# Patient Record
Sex: MALE | Race: WHITE | NOT HISPANIC OR LATINO
[De-identification: names, ages, dates, MRNs, and addresses within clinical notes are randomized per-mention and may not be internally consistent; named-entity substitution may affect disease eponyms.]

---

## 2020-05-27 PROBLEM — Z00.00 ENCOUNTER FOR PREVENTIVE HEALTH EXAMINATION: Status: ACTIVE | Noted: 2020-05-27

## 2020-05-28 ENCOUNTER — APPOINTMENT (OUTPATIENT)
Dept: PULMONOLOGY | Facility: CLINIC | Age: 50
End: 2020-05-28
Payer: COMMERCIAL

## 2020-05-28 VITALS — RESPIRATION RATE: 16 BRPM

## 2020-05-28 VITALS
DIASTOLIC BLOOD PRESSURE: 60 MMHG | HEART RATE: 65 BPM | BODY MASS INDEX: 31.22 KG/M2 | OXYGEN SATURATION: 98 % | SYSTOLIC BLOOD PRESSURE: 130 MMHG | HEIGHT: 68 IN | WEIGHT: 206 LBS

## 2020-05-28 PROCEDURE — 99205 OFFICE O/P NEW HI 60 MIN: CPT

## 2020-05-28 RX ORDER — PANTOPRAZOLE 40 MG/1
40 TABLET, DELAYED RELEASE ORAL
Refills: 0 | Status: ACTIVE | COMMUNITY

## 2020-05-28 RX ORDER — TRAZODONE HYDROCHLORIDE 100 MG/1
100 TABLET ORAL
Refills: 0 | Status: ACTIVE | COMMUNITY

## 2020-05-28 NOTE — ASSESSMENT
[FreeTextEntry1] : I suspect that the patient's cough may be secondary to reflux. He has Campos's esophagus and a history of sleep apnea which could perpetuate reflux. I am adding famotidine 20 mg daily to his regimen. We will get a CT of the chest to look for abnormalities such as bronchiectasis or evidence for aspiration. A repeat sleep study has been ordered as well.\par \par Pulmonary function studies have been scheduled. I will see the patient back after the sleep study is done to review all data and follow his response to famotidine.

## 2020-05-28 NOTE — PHYSICAL EXAM
[No Acute Distress] : no acute distress [III] : Mallampati Class: III [Low Lying Soft Palate] : low lying soft palate [Normal Appearance] : normal appearance [Neck Circumference: ___] : neck circumference: [unfilled] [No Neck Mass] : no neck mass [Normal S1, S2] : normal s1, s2 [Normal Rate/Rhythm] : normal rate/rhythm [No Murmurs] : no murmurs [No Resp Distress] : no resp distress [Clear to Auscultation Bilaterally] : clear to auscultation bilaterally [No Abnormalities] : no abnormalities [Benign] : benign [Normal Gait] : normal gait [No Clubbing] : no clubbing [No Cyanosis] : no cyanosis [No Edema] : no edema [FROM] : FROM [Normal Color/ Pigmentation] : normal color/ pigmentation [No Focal Deficits] : no focal deficits [Normal Affect] : normal affect [Oriented x3] : oriented x3 [TextBox_2] : overweight [TextBox_68] : hacking cough with inspiration.

## 2020-05-28 NOTE — HISTORY OF PRESENT ILLNESS
[Former] : former [Never] : never [TextBox_4] : The patient is a 50-year-old male complaining of a cough for the past 6 months. The cough occurs throughout the day often after taking deep breaths and does keep him up at night. He reports minimal amounts of sputum. The patient initially was treated with antibiotics without relief. He then saw a pulmonologist who put him on Symbicort and prednisone. Initially he felt slight relief while on the prednisone. Things got worse when the prednisone was tapered. He was tried on a higher dose which he could not tolerate. Chest x-ray was unremarkable. A CT scan was ordered but not approved by his insurance company at that time. There's been a mild degree of shortness of breath with this. The patient cannot identify any inciting event that started this cough. He is a lifelong nonsmoker. He did use medical marijuana oil vapor a few months before.\par \par The patient has a history of Campos's esophagus. He is on pantoprazole for that. He had a sleep study done about a year ago showing mild obstructive sleep apnea. He was given CPAP but it was discontinued because of noncompliance.Denies any sinus issues or nasal stuffiness.\par \par The patient worked as a  and denies any toxic gas exposure. He has tested negative for Covid 19  recently.

## 2020-06-03 ENCOUNTER — APPOINTMENT (OUTPATIENT)
Dept: DISASTER EMERGENCY | Facility: CLINIC | Age: 50
End: 2020-06-03

## 2020-06-04 ENCOUNTER — APPOINTMENT (OUTPATIENT)
Dept: PULMONOLOGY | Facility: CLINIC | Age: 50
End: 2020-06-04
Payer: COMMERCIAL

## 2020-06-04 ENCOUNTER — TRANSCRIPTION ENCOUNTER (OUTPATIENT)
Age: 50
End: 2020-06-04

## 2020-06-04 VITALS — WEIGHT: 206 LBS | BODY MASS INDEX: 31.22 KG/M2 | HEIGHT: 68 IN

## 2020-06-04 LAB — SARS-COV-2 N GENE NPH QL NAA+PROBE: NOT DETECTED

## 2020-06-04 PROCEDURE — 85018 HEMOGLOBIN: CPT | Mod: QW

## 2020-06-04 PROCEDURE — 94727 GAS DIL/WSHOT DETER LNG VOL: CPT

## 2020-06-04 PROCEDURE — 94010 BREATHING CAPACITY TEST: CPT

## 2020-06-04 PROCEDURE — 94729 DIFFUSING CAPACITY: CPT

## 2020-06-09 ENCOUNTER — APPOINTMENT (OUTPATIENT)
Dept: CT IMAGING | Facility: CLINIC | Age: 50
End: 2020-06-09
Payer: COMMERCIAL

## 2020-06-09 ENCOUNTER — OUTPATIENT (OUTPATIENT)
Dept: OUTPATIENT SERVICES | Facility: HOSPITAL | Age: 50
LOS: 1 days | End: 2020-06-09
Payer: COMMERCIAL

## 2020-06-09 DIAGNOSIS — Z00.8 ENCOUNTER FOR OTHER GENERAL EXAMINATION: ICD-10-CM

## 2020-06-09 DIAGNOSIS — R05 COUGH: ICD-10-CM

## 2020-06-09 PROCEDURE — 71250 CT THORAX DX C-: CPT | Mod: 26

## 2020-06-09 PROCEDURE — 71250 CT THORAX DX C-: CPT

## 2020-06-11 ENCOUNTER — APPOINTMENT (OUTPATIENT)
Dept: PULMONOLOGY | Facility: CLINIC | Age: 50
End: 2020-06-11
Payer: COMMERCIAL

## 2020-06-11 VITALS — DIASTOLIC BLOOD PRESSURE: 78 MMHG | SYSTOLIC BLOOD PRESSURE: 138 MMHG | HEART RATE: 88 BPM | OXYGEN SATURATION: 98 %

## 2020-06-11 VITALS — RESPIRATION RATE: 16 BRPM

## 2020-06-11 DIAGNOSIS — Z87.19 PERSONAL HISTORY OF OTHER DISEASES OF THE DIGESTIVE SYSTEM: ICD-10-CM

## 2020-06-11 PROCEDURE — 99214 OFFICE O/P EST MOD 30 MIN: CPT | Mod: 25

## 2020-06-11 NOTE — PHYSICAL EXAM
[No Acute Distress] : no acute distress [Low Lying Soft Palate] : low lying soft palate [Normal Appearance] : normal appearance [III] : Mallampati Class: III [Neck Circumference: ___] : neck circumference: [unfilled] [Normal Rate/Rhythm] : normal rate/rhythm [No Neck Mass] : no neck mass [No Murmurs] : no murmurs [Normal S1, S2] : normal s1, s2 [No Resp Distress] : no resp distress [Clear to Auscultation Bilaterally] : clear to auscultation bilaterally [No Abnormalities] : no abnormalities [Benign] : benign [Normal Gait] : normal gait [No Cyanosis] : no cyanosis [No Clubbing] : no clubbing [No Edema] : no edema [FROM] : FROM [No Focal Deficits] : no focal deficits [Normal Color/ Pigmentation] : normal color/ pigmentation [Oriented x3] : oriented x3 [TextBox_2] : overweight [TextBox_68] : hacking cough with inspiration. [Normal Affect] : normal affect

## 2020-06-11 NOTE — ASSESSMENT
[FreeTextEntry1] : Ongoing cough.  Still most likely GERD related, possibly related to untreated CHRIS.  Will give  Benzonatate.  ENT eval suggested--referral given.\par Anemia, splenomegaly--heme referral given

## 2020-06-17 ENCOUNTER — OUTPATIENT (OUTPATIENT)
Dept: OUTPATIENT SERVICES | Facility: HOSPITAL | Age: 50
LOS: 1 days | Discharge: HOME | End: 2020-06-17

## 2020-06-17 DIAGNOSIS — D64.9 ANEMIA, UNSPECIFIED: ICD-10-CM

## 2020-06-22 ENCOUNTER — APPOINTMENT (OUTPATIENT)
Dept: HEMATOLOGY ONCOLOGY | Facility: CLINIC | Age: 50
End: 2020-06-22
Payer: COMMERCIAL

## 2020-06-22 DIAGNOSIS — R16.1 SPLENOMEGALY, NOT ELSEWHERE CLASSIFIED: ICD-10-CM

## 2020-06-22 PROCEDURE — 99202 OFFICE O/P NEW SF 15 MIN: CPT | Mod: 95

## 2020-06-22 NOTE — ASSESSMENT
[FreeTextEntry1] : Unexplained NC, NC anemia in a 49 y/o male with splenomegaly, intermittent fever.\par Will check CT A/P and anemia panel.\par if no clear diagnosis, will proceed to bone marrow aspiration.\par \par 20 minutes spent reviewing history, discussing current clinical status and planning workup.

## 2020-06-22 NOTE — HISTORY OF PRESENT ILLNESS
[Home] : at home, [unfilled] , at the time of the visit. [Medical Office: ([facility])___] : at the medical office located in  [Spouse] : spouse [Verbal consent obtained from patient] : the patient, [unfilled] [de-identified] : This 51 y/o male is referred for H/H 10.4/29.9.MCV 88.\par WBC, diff and platelets all normal.\par Cough with exertional fatigue over past 8 months, with workup showing 2-3 mm bilateral tiny pulmonary nodules and mild enlargement of spleen seen on chest CT.\par denies sweats, but runs fever to 100 weekly.\par Weight stable, other lab unremarkable.

## 2020-06-23 ENCOUNTER — RX CHANGE (OUTPATIENT)
Age: 50
End: 2020-06-23

## 2020-06-25 ENCOUNTER — APPOINTMENT (OUTPATIENT)
Dept: CT IMAGING | Facility: CLINIC | Age: 50
End: 2020-06-25
Payer: COMMERCIAL

## 2020-06-25 ENCOUNTER — OUTPATIENT (OUTPATIENT)
Dept: OUTPATIENT SERVICES | Facility: HOSPITAL | Age: 50
LOS: 1 days | End: 2020-06-25
Payer: COMMERCIAL

## 2020-06-25 DIAGNOSIS — Z00.8 ENCOUNTER FOR OTHER GENERAL EXAMINATION: ICD-10-CM

## 2020-06-25 PROCEDURE — 74177 CT ABD & PELVIS W/CONTRAST: CPT

## 2020-06-25 PROCEDURE — 74177 CT ABD & PELVIS W/CONTRAST: CPT | Mod: 26

## 2020-06-30 LAB
ERYTHROCYTE [SEDIMENTATION RATE] IN BLOOD BY WESTERGREN METHOD: 2 MM/HR
FERRITIN SERPL-MCNC: 335 NG/ML
FOLATE SERPL-MCNC: >20 NG/ML
RBC # BLD: 3.61 M/UL
RETICS # AUTO: 3.2 %
RETICS AGGREG/RBC NFR: 114.8 K/UL
VIT B12 SERPL-MCNC: 394 PG/ML

## 2020-07-01 LAB
ALBUMIN MFR SERPL ELPH: 66.4 %
ALBUMIN SERPL-MCNC: 3.9 G/DL
ALBUMIN/GLOB SERPL: 2 RATIO
ALPHA1 GLOB MFR SERPL ELPH: 6.9 %
ALPHA1 GLOB SERPL ELPH-MCNC: 0.4 G/DL
ALPHA2 GLOB MFR SERPL ELPH: 7.8 %
ALPHA2 GLOB SERPL ELPH-MCNC: 0.5 G/DL
B-GLOBULIN MFR SERPL ELPH: 10.3 %
B-GLOBULIN SERPL ELPH-MCNC: 0.6 G/DL
DEPRECATED KAPPA LC FREE/LAMBDA SER: 1.12 RATIO
GAMMA GLOB FLD ELPH-MCNC: 0.5 G/DL
GAMMA GLOB MFR SERPL ELPH: 8.6 %
IGA SER QL IEP: 29 MG/DL
IGG SER QL IEP: 504 MG/DL
IGM SER QL IEP: 72 MG/DL
INTERPRETATION SERPL IEP-IMP: NORMAL
KAPPA LC CSF-MCNC: 0.65 MG/DL
KAPPA LC SERPL-MCNC: 0.73 MG/DL
M PROTEIN MFR SERPL ELPH: NORMAL %
M PROTEIN SPEC IFE-MCNC: NORMAL
MONOCLON BAND OBS SERPL: NORMAL G/DL
PROT SERPL-MCNC: 5.9 G/DL
PROT SERPL-MCNC: 5.9 G/DL

## 2020-07-10 ENCOUNTER — OUTPATIENT (OUTPATIENT)
Dept: OUTPATIENT SERVICES | Facility: HOSPITAL | Age: 50
LOS: 1 days | End: 2020-07-10

## 2020-07-10 ENCOUNTER — APPOINTMENT (OUTPATIENT)
Dept: MRI IMAGING | Facility: CLINIC | Age: 50
End: 2020-07-10
Payer: COMMERCIAL

## 2020-07-10 DIAGNOSIS — N28.9 DISORDER OF KIDNEY AND URETER, UNSPECIFIED: ICD-10-CM

## 2020-07-10 PROCEDURE — 74183 MRI ABD W/O CNTR FLWD CNTR: CPT | Mod: 26

## 2020-07-17 ENCOUNTER — OUTPATIENT (OUTPATIENT)
Dept: OUTPATIENT SERVICES | Facility: HOSPITAL | Age: 50
LOS: 1 days | Discharge: HOME | End: 2020-07-17

## 2020-07-17 DIAGNOSIS — D64.9 ANEMIA, UNSPECIFIED: ICD-10-CM

## 2020-07-21 ENCOUNTER — RESULT REVIEW (OUTPATIENT)
Age: 50
End: 2020-07-21

## 2020-07-21 ENCOUNTER — APPOINTMENT (OUTPATIENT)
Dept: HEMATOLOGY ONCOLOGY | Facility: CLINIC | Age: 50
End: 2020-07-21
Payer: COMMERCIAL

## 2020-07-21 VITALS
OXYGEN SATURATION: 98 % | WEIGHT: 201 LBS | DIASTOLIC BLOOD PRESSURE: 78 MMHG | HEIGHT: 68 IN | HEART RATE: 77 BPM | BODY MASS INDEX: 30.46 KG/M2 | SYSTOLIC BLOOD PRESSURE: 136 MMHG

## 2020-07-21 DIAGNOSIS — N28.9 DISORDER OF KIDNEY AND URETER, UNSPECIFIED: ICD-10-CM

## 2020-07-21 DIAGNOSIS — D64.9 ANEMIA, UNSPECIFIED: ICD-10-CM

## 2020-07-21 LAB
BASOPHILS # BLD AUTO: 0 K/UL — SIGNIFICANT CHANGE UP (ref 0–0.2)
BASOPHILS NFR BLD AUTO: 0.5 % — SIGNIFICANT CHANGE UP (ref 0–2)
EOSINOPHIL # BLD AUTO: 0 K/UL — SIGNIFICANT CHANGE UP (ref 0–0.5)
EOSINOPHIL NFR BLD AUTO: 0.2 % — SIGNIFICANT CHANGE UP (ref 0–6)
HCT VFR BLD CALC: 29.4 % — LOW (ref 39–50)
HGB BLD-MCNC: 10.5 G/DL — LOW (ref 13–17)
LYMPHOCYTES # BLD AUTO: 0.9 K/UL — LOW (ref 1–3.3)
LYMPHOCYTES # BLD AUTO: 16.2 % — SIGNIFICANT CHANGE UP (ref 13–44)
MCHC RBC-ENTMCNC: 31 PG — SIGNIFICANT CHANGE UP (ref 27–34)
MCHC RBC-ENTMCNC: 35.6 G/DL — SIGNIFICANT CHANGE UP (ref 32–36)
MCV RBC AUTO: 87.2 FL — SIGNIFICANT CHANGE UP (ref 80–100)
MONOCYTES # BLD AUTO: 0.7 K/UL — SIGNIFICANT CHANGE UP (ref 0–0.9)
MONOCYTES NFR BLD AUTO: 13.2 % — SIGNIFICANT CHANGE UP (ref 2–14)
NEUTROPHILS # BLD AUTO: 3.9 K/UL — SIGNIFICANT CHANGE UP (ref 1.8–7.4)
NEUTROPHILS NFR BLD AUTO: 70 % — SIGNIFICANT CHANGE UP (ref 43–77)
PLATELET # BLD AUTO: 196 K/UL — SIGNIFICANT CHANGE UP (ref 150–400)
RBC # BLD: 3.38 M/UL — LOW (ref 4.2–5.8)
RBC # FLD: 12.1 % — SIGNIFICANT CHANGE UP (ref 10.3–14.5)
WBC # BLD: 5.6 K/UL — SIGNIFICANT CHANGE UP (ref 3.8–10.5)
WBC # FLD AUTO: 5.6 K/UL — SIGNIFICANT CHANGE UP (ref 3.8–10.5)

## 2020-07-21 PROCEDURE — 99213 OFFICE O/P EST LOW 20 MIN: CPT

## 2020-07-22 ENCOUNTER — APPOINTMENT (OUTPATIENT)
Dept: UROLOGY | Facility: CLINIC | Age: 50
End: 2020-07-22
Payer: COMMERCIAL

## 2020-07-22 VITALS
DIASTOLIC BLOOD PRESSURE: 79 MMHG | SYSTOLIC BLOOD PRESSURE: 135 MMHG | WEIGHT: 198 LBS | HEART RATE: 78 BPM | TEMPERATURE: 98.6 F | OXYGEN SATURATION: 98 % | HEIGHT: 69 IN | BODY MASS INDEX: 29.33 KG/M2

## 2020-07-22 DIAGNOSIS — N28.89 OTHER SPECIFIED DISORDERS OF KIDNEY AND URETER: ICD-10-CM

## 2020-07-22 PROBLEM — D64.9 ANEMIA: Status: ACTIVE | Noted: 2020-06-11

## 2020-07-22 PROBLEM — N28.9 KIDNEY LESION: Status: ACTIVE | Noted: 2020-06-29

## 2020-07-22 PROCEDURE — 99203 OFFICE O/P NEW LOW 30 MIN: CPT

## 2020-07-22 NOTE — ASSESSMENT
[FreeTextEntry1] : 49 yo male with 2.6 cm enhancing mass in lower pole of left kidney suspicious for RCC. \par Discussed robotic left partial nephrectomy with patient and his wife at length. All risks, benefits explained, questions and concerned answered. Patient would like to proceed with Robotic left partial nephrectomy.\par Renal scan with Lasix is ordered.\par We discussed the natural history of solid renal masses.   Options of partial nephrectomy (robotic vs laparoscopic vs open), surveillance and ablative treatment was discussed in detail.    We discussed the R/B/A and complications of all options.   Robotic partial nephrectomy was discussed in detail.   We discussed the possibility of open conversion or laparoscopic conversion.   We also discussed the possibility of radical nephrectomy.   Intraoperative hemorrhage vs delayed bleeding and urinary leakage was discussed in detail.   All questions were answered as well as instructions were given.   Renal scan will be scheduled and she will need medical clearance from her primary care doctor.\par \par \par \par

## 2020-07-22 NOTE — REVIEW OF SYSTEMS
[Fever] : fever [Feeling Tired] : feeling tired [Shortness Of Breath] : shortness of breath [Cough] : cough [see HPI] : see HPI [Wake up at night to urinate  How many times?  ___] : wakes up to urinate [unfilled] times during the night [Negative] : Genitourinary

## 2020-07-22 NOTE — HISTORY OF PRESENT ILLNESS
[de-identified] : Hgb. 10.5.\par Reviewed the discovery of left lower pole 2.6 cm renal mass, and referral to Dr. Mack for consideration of partial nephrectomy.

## 2020-07-22 NOTE — HISTORY OF PRESENT ILLNESS
[FreeTextEntry1] : 50 year old male presents with an incidental finding of 2.6 cm lower pole lesion on CT and MRI. Patient referred by Dr. Mcdaniel to discuss robotic partial left nephrectomy. He has no symptoms and states this lesion was found incidentally on a chest CT when he was being evaluated for a cough which has now resolved. \par \par He has no other major comorbidities [Urinary Retention] : no urinary retention [Urinary Incontinence] : no urinary incontinence [Nocturia] : no nocturia [Urinary Frequency] : no urinary frequency [Urinary Urgency] : no urinary urgency [Weak Stream] : no weak stream [Straining] : no straining [Intermittency] : no intermittency [Dysuria] : no dysuria [Hematuria - Gross] : no gross hematuria [Abdominal Pain] : no abdominal pain [Flank Pain] : no flank pain

## 2020-07-30 ENCOUNTER — RESULT REVIEW (OUTPATIENT)
Age: 50
End: 2020-07-30

## 2020-07-30 ENCOUNTER — OUTPATIENT (OUTPATIENT)
Dept: OUTPATIENT SERVICES | Facility: HOSPITAL | Age: 50
LOS: 1 days | End: 2020-07-30
Payer: COMMERCIAL

## 2020-07-30 VITALS
HEIGHT: 69 IN | HEART RATE: 76 BPM | RESPIRATION RATE: 18 BRPM | DIASTOLIC BLOOD PRESSURE: 69 MMHG | WEIGHT: 199.52 LBS | OXYGEN SATURATION: 100 % | TEMPERATURE: 98 F | SYSTOLIC BLOOD PRESSURE: 134 MMHG

## 2020-07-30 DIAGNOSIS — N28.9 DISORDER OF KIDNEY AND URETER, UNSPECIFIED: ICD-10-CM

## 2020-07-30 DIAGNOSIS — Z98.890 OTHER SPECIFIED POSTPROCEDURAL STATES: Chronic | ICD-10-CM

## 2020-07-30 DIAGNOSIS — Z01.818 ENCOUNTER FOR OTHER PREPROCEDURAL EXAMINATION: ICD-10-CM

## 2020-07-30 LAB
ANION GAP SERPL CALC-SCNC: 2 MMOL/L — LOW (ref 5–17)
APPEARANCE UR: CLEAR — SIGNIFICANT CHANGE UP
APTT BLD: 29.9 SEC — SIGNIFICANT CHANGE UP (ref 27.5–35.5)
BASOPHILS # BLD AUTO: 0.01 K/UL — SIGNIFICANT CHANGE UP (ref 0–0.2)
BASOPHILS NFR BLD AUTO: 0.2 % — SIGNIFICANT CHANGE UP (ref 0–2)
BILIRUB UR-MCNC: NEGATIVE — SIGNIFICANT CHANGE UP
BUN SERPL-MCNC: 10 MG/DL — SIGNIFICANT CHANGE UP (ref 7–23)
CALCIUM SERPL-MCNC: 8.4 MG/DL — LOW (ref 8.5–10.1)
CHLORIDE SERPL-SCNC: 109 MMOL/L — HIGH (ref 96–108)
CO2 SERPL-SCNC: 31 MMOL/L — SIGNIFICANT CHANGE UP (ref 22–31)
COLOR SPEC: YELLOW — SIGNIFICANT CHANGE UP
CREAT SERPL-MCNC: 1.17 MG/DL — SIGNIFICANT CHANGE UP (ref 0.5–1.3)
DIFF PNL FLD: NEGATIVE — SIGNIFICANT CHANGE UP
EOSINOPHIL # BLD AUTO: 0 K/UL — SIGNIFICANT CHANGE UP (ref 0–0.5)
EOSINOPHIL NFR BLD AUTO: 0 % — SIGNIFICANT CHANGE UP (ref 0–6)
GLUCOSE SERPL-MCNC: 106 MG/DL — HIGH (ref 70–99)
GLUCOSE UR QL: NEGATIVE MG/DL — SIGNIFICANT CHANGE UP
HCT VFR BLD CALC: 32 % — LOW (ref 39–50)
HGB BLD-MCNC: 11 G/DL — LOW (ref 13–17)
IMM GRANULOCYTES NFR BLD AUTO: 0.4 % — SIGNIFICANT CHANGE UP (ref 0–1.5)
INR BLD: 1.15 RATIO — SIGNIFICANT CHANGE UP (ref 0.88–1.16)
KETONES UR-MCNC: NEGATIVE — SIGNIFICANT CHANGE UP
LEUKOCYTE ESTERASE UR-ACNC: NEGATIVE — SIGNIFICANT CHANGE UP
LYMPHOCYTES # BLD AUTO: 0.83 K/UL — LOW (ref 1–3.3)
LYMPHOCYTES # BLD AUTO: 14.8 % — SIGNIFICANT CHANGE UP (ref 13–44)
MCHC RBC-ENTMCNC: 30.7 PG — SIGNIFICANT CHANGE UP (ref 27–34)
MCHC RBC-ENTMCNC: 34.4 GM/DL — SIGNIFICANT CHANGE UP (ref 32–36)
MCV RBC AUTO: 89.4 FL — SIGNIFICANT CHANGE UP (ref 80–100)
MONOCYTES # BLD AUTO: 0.85 K/UL — SIGNIFICANT CHANGE UP (ref 0–0.9)
MONOCYTES NFR BLD AUTO: 15.2 % — HIGH (ref 2–14)
NEUTROPHILS # BLD AUTO: 3.9 K/UL — SIGNIFICANT CHANGE UP (ref 1.8–7.4)
NEUTROPHILS NFR BLD AUTO: 69.4 % — SIGNIFICANT CHANGE UP (ref 43–77)
NITRITE UR-MCNC: NEGATIVE — SIGNIFICANT CHANGE UP
PH UR: 5 — SIGNIFICANT CHANGE UP (ref 5–8)
PLATELET # BLD AUTO: 212 K/UL — SIGNIFICANT CHANGE UP (ref 150–400)
POTASSIUM SERPL-MCNC: 4.2 MMOL/L — SIGNIFICANT CHANGE UP (ref 3.5–5.3)
POTASSIUM SERPL-SCNC: 4.2 MMOL/L — SIGNIFICANT CHANGE UP (ref 3.5–5.3)
PROT UR-MCNC: NEGATIVE MG/DL — SIGNIFICANT CHANGE UP
PROTHROM AB SERPL-ACNC: 13.3 SEC — SIGNIFICANT CHANGE UP (ref 10.6–13.6)
RBC # BLD: 3.58 M/UL — LOW (ref 4.2–5.8)
RBC # FLD: 13.9 % — SIGNIFICANT CHANGE UP (ref 10.3–14.5)
SODIUM SERPL-SCNC: 142 MMOL/L — SIGNIFICANT CHANGE UP (ref 135–145)
SP GR SPEC: 1.01 — SIGNIFICANT CHANGE UP (ref 1.01–1.02)
UROBILINOGEN FLD QL: 1 MG/DL
WBC # BLD: 5.61 K/UL — SIGNIFICANT CHANGE UP (ref 3.8–10.5)
WBC # FLD AUTO: 5.61 K/UL — SIGNIFICANT CHANGE UP (ref 3.8–10.5)

## 2020-07-30 PROCEDURE — 93010 ELECTROCARDIOGRAM REPORT: CPT

## 2020-07-30 PROCEDURE — 86900 BLOOD TYPING SEROLOGIC ABO: CPT

## 2020-07-30 PROCEDURE — 36415 COLL VENOUS BLD VENIPUNCTURE: CPT

## 2020-07-30 PROCEDURE — 85730 THROMBOPLASTIN TIME PARTIAL: CPT

## 2020-07-30 PROCEDURE — 87086 URINE CULTURE/COLONY COUNT: CPT

## 2020-07-30 PROCEDURE — 86850 RBC ANTIBODY SCREEN: CPT

## 2020-07-30 PROCEDURE — 81003 URINALYSIS AUTO W/O SCOPE: CPT

## 2020-07-30 PROCEDURE — 86901 BLOOD TYPING SEROLOGIC RH(D): CPT

## 2020-07-30 PROCEDURE — 85025 COMPLETE CBC W/AUTO DIFF WBC: CPT

## 2020-07-30 PROCEDURE — 80048 BASIC METABOLIC PNL TOTAL CA: CPT

## 2020-07-30 PROCEDURE — 71046 X-RAY EXAM CHEST 2 VIEWS: CPT | Mod: 26

## 2020-07-30 PROCEDURE — 93005 ELECTROCARDIOGRAM TRACING: CPT

## 2020-07-30 PROCEDURE — G0463: CPT | Mod: 25

## 2020-07-30 PROCEDURE — 85610 PROTHROMBIN TIME: CPT

## 2020-07-30 PROCEDURE — 71046 X-RAY EXAM CHEST 2 VIEWS: CPT

## 2020-07-30 NOTE — H&P PST ADULT - ASSESSMENT
This is a 49 y/o  male with a Left kidney mass who is scheduled for a Left partial nephrectomy     Patient instructed on     1. NPO post midnight of surgery  2. On the use of EZ sponges  3. Aware that he needs medical clearance (has an appointment with Dr. Edward Kaur on 2020)  4. May take Pepcid and Protonix with a sip of water on morning of procedure   5. Aware he has COVID 19 testing on August 3, in [place] CAPRINI SCORE    AGE RELATED RISK FACTORS                                                       MOBILITY RELATED FACTORS  X Age 41-60 years                                            (1 Point)                  [ ] Bed rest                                                        (1 Point)  [ ] Age: 61-74 years                                           (2 Points)                [ ] Plaster cast                                                   (2 Points)  [ ] Age= 75 years                                              (3 Points)                 [ ] Bed bound for more than 72 hours                   (2 Points)    DISEASE RELATED RISK FACTORS                                               GENDER SPECIFIC FACTORS  [ ] Edema in the lower extremities                       (1 Point)                  [ ] Pregnancy                                                     (1 Point)  [ ] Varicose veins                                               (1 Point)                  [ ] Post-partum < 6 weeks                                   (1 Point)             X BMI > 25 Kg/m2                                            (1 Point)                  [ ] Hormonal therapy  or oral contraception            (1 Point)                 [ ] Sepsis (in the previous month)                        (1 Point)                  [ ] History of pregnancy complications  [ ] Pneumonia or serious lung disease                                               [ ] Unexplained or recurrent                       (1 Point)           (in the previous month)                               (1 Point)  [ ] Abnormal pulmonary function test                     (1 Point)                 SURGERY RELATED RISK FACTORS  [ ] Acute myocardial infarction                              (1 Point)                 [ ]  Section                                            (1 Point)  [ ] Congestive heart failure (in the previous month)  (1 Point)                 [ ] Minor surgery                                                 (1 Point)   [ ] Inflammatory bowel disease                             (1 Point)                 [ ] Arthroscopic surgery                                        (2 Points)  [ ] Central venous access                                    (2 Points)                  X  General surgery lasting more than 45 minutes   (2 Points)       [ ] Stroke (in the previous month)                          (5 Points)               [ ] Elective arthroplasty                                        (5 Points)            [ ] malignancy                                                             (2 points)                                                                                                                                 HEMATOLOGY RELATED FACTORS                                                 TRAUMA RELATED RISK FACTORS  [ ] Prior episodes of VTE                                     (3 Points)                 [ ] Fracture of the hip, pelvis, or leg                       (5 Points)  [ ] Positive family history for VTE                         (3 Points)                 [ ] Acute spinal cord injury (in the previous month)  (5 Points)  [ ] Prothrombin 61328 A                                      (3 Points)                 [ ] Paralysis  (less than 1 month)                          (5 Points)  [ ] Factor V Leiden                                             (3 Points)                 [ ] Multiple Trauma within 1 month                         (5 Points)  [ ] Lupus anticoagulants                                     (3 Points)                                                           [ ] Anticardiolipin antibodies                                (3 Points)                                                       [ ] High homocysteine in the blood                      (3 Points)                                             [ ] Other congenital or acquired thrombophilia       (3 Points)                                                [ ] Heparin induced thrombocytopenia                  (3 Points)                                          Total Score: 4    The Caprini score indicates this patient is at risk for a VTE event (score 3-5).  Most surgical patients in this group would benefit from pharmacologic prophylaxis.  The surgical team will determine the balance between VTE risk and bleeding risk

## 2020-07-30 NOTE — H&P PST ADULT - NSICDXPASTSURGICALHX_GEN_ALL_CORE_FT
PAST SURGICAL HISTORY:  H/O hernia repair Left inguinal hernia 2005    History of arthroscopy of shoulder Left shoulder 9/2018    S/P left knee arthroscopy 9/2017 and 8/2019

## 2020-07-30 NOTE — H&P PST ADULT - NSICDXPASTMEDICALHX_GEN_ALL_CORE_FT
PAST MEDICAL HISTORY:  Anemia     Campos esophagus     Childhood asthma without complication     GERD (gastroesophageal reflux disease)     Kidney mass Left kidney    Sleep apnea does not use any cpap or bipap

## 2020-07-30 NOTE — H&P PST ADULT - HISTORY OF PRESENT ILLNESS
This is a 49 y/o male with a PMH of Barretts esophagus, sleep apnea, GERD, anemia who reports that he started with a cough in November of 2019. Work up included a CT scan of his chest and an incidental finding of a Left kidney lesion was found. He was told his cough may be related to GERD or Barrettes esophagus He reports he was also having an on and off fever from January 2020 to March 2020, no fevers since March. He was tested for COVID 19 and was negative. He ruby any dysuria, hematuria frequency or nocturia. He is now scheduled for a partial Left nephrectomy.

## 2020-07-31 DIAGNOSIS — Z01.818 ENCOUNTER FOR OTHER PREPROCEDURAL EXAMINATION: ICD-10-CM

## 2020-07-31 DIAGNOSIS — N28.9 DISORDER OF KIDNEY AND URETER, UNSPECIFIED: ICD-10-CM

## 2020-07-31 PROBLEM — G47.30 SLEEP APNEA, UNSPECIFIED: Chronic | Status: ACTIVE | Noted: 2020-07-30

## 2020-07-31 PROBLEM — K22.70 BARRETT'S ESOPHAGUS WITHOUT DYSPLASIA: Chronic | Status: ACTIVE | Noted: 2020-07-30

## 2020-07-31 PROBLEM — K21.9 GASTRO-ESOPHAGEAL REFLUX DISEASE WITHOUT ESOPHAGITIS: Chronic | Status: ACTIVE | Noted: 2020-07-30

## 2020-07-31 PROBLEM — J45.909 UNSPECIFIED ASTHMA, UNCOMPLICATED: Chronic | Status: ACTIVE | Noted: 2020-07-30

## 2020-07-31 PROBLEM — N28.89 OTHER SPECIFIED DISORDERS OF KIDNEY AND URETER: Chronic | Status: ACTIVE | Noted: 2020-07-30

## 2020-07-31 PROBLEM — D64.9 ANEMIA, UNSPECIFIED: Chronic | Status: ACTIVE | Noted: 2020-07-30

## 2020-07-31 LAB
CULTURE RESULTS: SIGNIFICANT CHANGE UP
SPECIMEN SOURCE: SIGNIFICANT CHANGE UP

## 2020-08-02 DIAGNOSIS — Z01.818 ENCOUNTER FOR OTHER PREPROCEDURAL EXAMINATION: ICD-10-CM

## 2020-08-03 ENCOUNTER — APPOINTMENT (OUTPATIENT)
Dept: DISASTER EMERGENCY | Facility: CLINIC | Age: 50
End: 2020-08-03

## 2020-08-03 ENCOUNTER — LABORATORY RESULT (OUTPATIENT)
Age: 50
End: 2020-08-03

## 2020-08-06 ENCOUNTER — APPOINTMENT (OUTPATIENT)
Dept: UROLOGY | Facility: HOSPITAL | Age: 50
End: 2020-08-06

## 2020-08-06 ENCOUNTER — INPATIENT (INPATIENT)
Facility: HOSPITAL | Age: 50
LOS: 1 days | Discharge: HOME | DRG: 657 | End: 2020-08-08
Attending: UROLOGY | Admitting: UROLOGY
Payer: COMMERCIAL

## 2020-08-06 ENCOUNTER — RESULT REVIEW (OUTPATIENT)
Age: 50
End: 2020-08-06

## 2020-08-06 VITALS
HEIGHT: 69 IN | HEART RATE: 75 BPM | OXYGEN SATURATION: 100 % | DIASTOLIC BLOOD PRESSURE: 85 MMHG | SYSTOLIC BLOOD PRESSURE: 143 MMHG | WEIGHT: 199.52 LBS | TEMPERATURE: 98 F | RESPIRATION RATE: 16 BRPM

## 2020-08-06 DIAGNOSIS — K21.9 GASTRO-ESOPHAGEAL REFLUX DISEASE WITHOUT ESOPHAGITIS: ICD-10-CM

## 2020-08-06 DIAGNOSIS — N28.89 OTHER SPECIFIED DISORDERS OF KIDNEY AND URETER: ICD-10-CM

## 2020-08-06 DIAGNOSIS — Z11.59 ENCOUNTER FOR SCREENING FOR OTHER VIRAL DISEASES: ICD-10-CM

## 2020-08-06 DIAGNOSIS — R05 COUGH: ICD-10-CM

## 2020-08-06 DIAGNOSIS — R00.0 TACHYCARDIA, UNSPECIFIED: ICD-10-CM

## 2020-08-06 DIAGNOSIS — N28.9 DISORDER OF KIDNEY AND URETER, UNSPECIFIED: ICD-10-CM

## 2020-08-06 DIAGNOSIS — Z82.49 FAMILY HISTORY OF ISCHEMIC HEART DISEASE AND OTHER DISEASES OF THE CIRCULATORY SYSTEM: ICD-10-CM

## 2020-08-06 DIAGNOSIS — E86.0 DEHYDRATION: ICD-10-CM

## 2020-08-06 DIAGNOSIS — C64.2 MALIGNANT NEOPLASM OF LEFT KIDNEY, EXCEPT RENAL PELVIS: ICD-10-CM

## 2020-08-06 DIAGNOSIS — Z80.7 FAMILY HISTORY OF OTHER MALIGNANT NEOPLASMS OF LYMPHOID, HEMATOPOIETIC AND RELATED TISSUES: ICD-10-CM

## 2020-08-06 DIAGNOSIS — Z98.890 OTHER SPECIFIED POSTPROCEDURAL STATES: Chronic | ICD-10-CM

## 2020-08-06 DIAGNOSIS — D62 ACUTE POSTHEMORRHAGIC ANEMIA: ICD-10-CM

## 2020-08-06 DIAGNOSIS — G47.33 OBSTRUCTIVE SLEEP APNEA (ADULT) (PEDIATRIC): ICD-10-CM

## 2020-08-06 DIAGNOSIS — K22.70 BARRETT'S ESOPHAGUS WITHOUT DYSPLASIA: ICD-10-CM

## 2020-08-06 LAB
ANION GAP SERPL CALC-SCNC: 6 MMOL/L — SIGNIFICANT CHANGE UP (ref 5–17)
BASOPHILS # BLD AUTO: 0.01 K/UL — SIGNIFICANT CHANGE UP (ref 0–0.2)
BASOPHILS NFR BLD AUTO: 0.1 % — SIGNIFICANT CHANGE UP (ref 0–2)
BUN SERPL-MCNC: 15 MG/DL — SIGNIFICANT CHANGE UP (ref 7–23)
CALCIUM SERPL-MCNC: 8.3 MG/DL — LOW (ref 8.5–10.1)
CHLORIDE SERPL-SCNC: 109 MMOL/L — HIGH (ref 96–108)
CO2 SERPL-SCNC: 25 MMOL/L — SIGNIFICANT CHANGE UP (ref 22–31)
CREAT SERPL-MCNC: 1.29 MG/DL — SIGNIFICANT CHANGE UP (ref 0.5–1.3)
EOSINOPHIL # BLD AUTO: 0 K/UL — SIGNIFICANT CHANGE UP (ref 0–0.5)
EOSINOPHIL NFR BLD AUTO: 0 % — SIGNIFICANT CHANGE UP (ref 0–6)
GLUCOSE SERPL-MCNC: 136 MG/DL — HIGH (ref 70–99)
HCT VFR BLD CALC: 31.2 % — LOW (ref 39–50)
HGB BLD-MCNC: 10.5 G/DL — LOW (ref 13–17)
IMM GRANULOCYTES NFR BLD AUTO: 0.4 % — SIGNIFICANT CHANGE UP (ref 0–1.5)
LYMPHOCYTES # BLD AUTO: 0.55 K/UL — LOW (ref 1–3.3)
LYMPHOCYTES # BLD AUTO: 4.6 % — LOW (ref 13–44)
MCHC RBC-ENTMCNC: 30.2 PG — SIGNIFICANT CHANGE UP (ref 27–34)
MCHC RBC-ENTMCNC: 33.7 GM/DL — SIGNIFICANT CHANGE UP (ref 32–36)
MCV RBC AUTO: 89.7 FL — SIGNIFICANT CHANGE UP (ref 80–100)
MONOCYTES # BLD AUTO: 0.39 K/UL — SIGNIFICANT CHANGE UP (ref 0–0.9)
MONOCYTES NFR BLD AUTO: 3.3 % — SIGNIFICANT CHANGE UP (ref 2–14)
NEUTROPHILS # BLD AUTO: 10.95 K/UL — HIGH (ref 1.8–7.4)
NEUTROPHILS NFR BLD AUTO: 91.6 % — HIGH (ref 43–77)
PLATELET # BLD AUTO: 199 K/UL — SIGNIFICANT CHANGE UP (ref 150–400)
POTASSIUM SERPL-MCNC: 4.4 MMOL/L — SIGNIFICANT CHANGE UP (ref 3.5–5.3)
POTASSIUM SERPL-SCNC: 4.4 MMOL/L — SIGNIFICANT CHANGE UP (ref 3.5–5.3)
RBC # BLD: 3.48 M/UL — LOW (ref 4.2–5.8)
RBC # FLD: 13.8 % — SIGNIFICANT CHANGE UP (ref 10.3–14.5)
SODIUM SERPL-SCNC: 140 MMOL/L — SIGNIFICANT CHANGE UP (ref 135–145)
WBC # BLD: 11.95 K/UL — HIGH (ref 3.8–10.5)
WBC # FLD AUTO: 11.95 K/UL — HIGH (ref 3.8–10.5)

## 2020-08-06 PROCEDURE — 88305 TISSUE EXAM BY PATHOLOGIST: CPT

## 2020-08-06 PROCEDURE — 88304 TISSUE EXAM BY PATHOLOGIST: CPT

## 2020-08-06 PROCEDURE — 76998 US GUIDE INTRAOP: CPT | Mod: 26

## 2020-08-06 PROCEDURE — 50543 LAPARO PARTIAL NEPHRECTOMY: CPT | Mod: AS,LT

## 2020-08-06 PROCEDURE — 88305 TISSUE EXAM BY PATHOLOGIST: CPT | Mod: 26

## 2020-08-06 PROCEDURE — 88304 TISSUE EXAM BY PATHOLOGIST: CPT | Mod: 26

## 2020-08-06 PROCEDURE — 36415 COLL VENOUS BLD VENIPUNCTURE: CPT

## 2020-08-06 PROCEDURE — 50543 LAPARO PARTIAL NEPHRECTOMY: CPT | Mod: LT

## 2020-08-06 PROCEDURE — 85027 COMPLETE CBC AUTOMATED: CPT

## 2020-08-06 PROCEDURE — 88307 TISSUE EXAM BY PATHOLOGIST: CPT | Mod: 26

## 2020-08-06 PROCEDURE — 80048 BASIC METABOLIC PNL TOTAL CA: CPT

## 2020-08-06 PROCEDURE — 83735 ASSAY OF MAGNESIUM: CPT

## 2020-08-06 PROCEDURE — S2900: CPT

## 2020-08-06 PROCEDURE — 85025 COMPLETE CBC W/AUTO DIFF WBC: CPT

## 2020-08-06 PROCEDURE — 84100 ASSAY OF PHOSPHORUS: CPT

## 2020-08-06 PROCEDURE — 94640 AIRWAY INHALATION TREATMENT: CPT

## 2020-08-06 PROCEDURE — 76998 US GUIDE INTRAOP: CPT | Mod: 26,AS

## 2020-08-06 PROCEDURE — 88342 IMHCHEM/IMCYTCHM 1ST ANTB: CPT

## 2020-08-06 PROCEDURE — C1889: CPT

## 2020-08-06 PROCEDURE — 99253 IP/OBS CNSLTJ NEW/EST LOW 45: CPT

## 2020-08-06 PROCEDURE — 88329 PATH CONSLTJ DRG SURG: CPT

## 2020-08-06 PROCEDURE — 88307 TISSUE EXAM BY PATHOLOGIST: CPT

## 2020-08-06 PROCEDURE — 88342 IMHCHEM/IMCYTCHM 1ST ANTB: CPT | Mod: 26

## 2020-08-06 RX ORDER — BUDESONIDE AND FORMOTEROL FUMARATE DIHYDRATE 160; 4.5 UG/1; UG/1
2 AEROSOL RESPIRATORY (INHALATION)
Refills: 0 | Status: DISCONTINUED | OUTPATIENT
Start: 2020-08-06 | End: 2020-08-08

## 2020-08-06 RX ORDER — ONDANSETRON 8 MG/1
4 TABLET, FILM COATED ORAL ONCE
Refills: 0 | Status: DISCONTINUED | OUTPATIENT
Start: 2020-08-06 | End: 2020-08-06

## 2020-08-06 RX ORDER — SODIUM CHLORIDE 9 MG/ML
1000 INJECTION INTRAMUSCULAR; INTRAVENOUS; SUBCUTANEOUS
Refills: 0 | Status: DISCONTINUED | OUTPATIENT
Start: 2020-08-06 | End: 2020-08-07

## 2020-08-06 RX ORDER — MORPHINE SULFATE 50 MG/1
4 CAPSULE, EXTENDED RELEASE ORAL EVERY 4 HOURS
Refills: 0 | Status: DISCONTINUED | OUTPATIENT
Start: 2020-08-06 | End: 2020-08-08

## 2020-08-06 RX ORDER — FAMOTIDINE 10 MG/ML
1 INJECTION INTRAVENOUS
Qty: 0 | Refills: 0 | DISCHARGE

## 2020-08-06 RX ORDER — HEPARIN SODIUM 5000 [USP'U]/ML
5000 INJECTION INTRAVENOUS; SUBCUTANEOUS EVERY 8 HOURS
Refills: 0 | Status: DISCONTINUED | OUTPATIENT
Start: 2020-08-06 | End: 2020-08-08

## 2020-08-06 RX ORDER — OXYCODONE HYDROCHLORIDE 5 MG/1
5 TABLET ORAL EVERY 6 HOURS
Refills: 0 | Status: DISCONTINUED | OUTPATIENT
Start: 2020-08-06 | End: 2020-08-08

## 2020-08-06 RX ORDER — PANTOPRAZOLE SODIUM 20 MG/1
40 TABLET, DELAYED RELEASE ORAL DAILY
Refills: 0 | Status: DISCONTINUED | OUTPATIENT
Start: 2020-08-06 | End: 2020-08-08

## 2020-08-06 RX ORDER — ONDANSETRON 8 MG/1
4 TABLET, FILM COATED ORAL EVERY 6 HOURS
Refills: 0 | Status: DISCONTINUED | OUTPATIENT
Start: 2020-08-06 | End: 2020-08-08

## 2020-08-06 RX ORDER — MEPERIDINE HYDROCHLORIDE 50 MG/ML
12.5 INJECTION INTRAMUSCULAR; INTRAVENOUS; SUBCUTANEOUS
Refills: 0 | Status: DISCONTINUED | OUTPATIENT
Start: 2020-08-06 | End: 2020-08-06

## 2020-08-06 RX ORDER — FENTANYL CITRATE 50 UG/ML
50 INJECTION INTRAVENOUS
Refills: 0 | Status: DISCONTINUED | OUTPATIENT
Start: 2020-08-06 | End: 2020-08-06

## 2020-08-06 RX ORDER — PANTOPRAZOLE SODIUM 20 MG/1
1 TABLET, DELAYED RELEASE ORAL
Qty: 0 | Refills: 0 | DISCHARGE

## 2020-08-06 RX ORDER — OXYCODONE HYDROCHLORIDE 5 MG/1
5 TABLET ORAL ONCE
Refills: 0 | Status: DISCONTINUED | OUTPATIENT
Start: 2020-08-06 | End: 2020-08-06

## 2020-08-06 RX ORDER — BUDESONIDE AND FORMOTEROL FUMARATE DIHYDRATE 160; 4.5 UG/1; UG/1
2 AEROSOL RESPIRATORY (INHALATION)
Qty: 0 | Refills: 0 | DISCHARGE

## 2020-08-06 RX ORDER — CEFAZOLIN SODIUM 1 G
2000 VIAL (EA) INJECTION EVERY 8 HOURS
Refills: 0 | Status: COMPLETED | OUTPATIENT
Start: 2020-08-06 | End: 2020-08-06

## 2020-08-06 RX ORDER — HYDROMORPHONE HYDROCHLORIDE 2 MG/ML
0.5 INJECTION INTRAMUSCULAR; INTRAVENOUS; SUBCUTANEOUS
Refills: 0 | Status: DISCONTINUED | OUTPATIENT
Start: 2020-08-06 | End: 2020-08-06

## 2020-08-06 RX ORDER — SODIUM CHLORIDE 9 MG/ML
1000 INJECTION, SOLUTION INTRAVENOUS
Refills: 0 | Status: DISCONTINUED | OUTPATIENT
Start: 2020-08-06 | End: 2020-08-06

## 2020-08-06 RX ADMIN — FENTANYL CITRATE 50 MICROGRAM(S): 50 INJECTION INTRAVENOUS at 11:56

## 2020-08-06 RX ADMIN — HEPARIN SODIUM 5000 UNIT(S): 5000 INJECTION INTRAVENOUS; SUBCUTANEOUS at 21:32

## 2020-08-06 RX ADMIN — OXYCODONE HYDROCHLORIDE 5 MILLIGRAM(S): 5 TABLET ORAL at 20:31

## 2020-08-06 RX ADMIN — OXYCODONE HYDROCHLORIDE 5 MILLIGRAM(S): 5 TABLET ORAL at 16:20

## 2020-08-06 RX ADMIN — OXYCODONE HYDROCHLORIDE 5 MILLIGRAM(S): 5 TABLET ORAL at 12:13

## 2020-08-06 RX ADMIN — FENTANYL CITRATE 50 MICROGRAM(S): 50 INJECTION INTRAVENOUS at 12:30

## 2020-08-06 RX ADMIN — OXYCODONE HYDROCHLORIDE 5 MILLIGRAM(S): 5 TABLET ORAL at 21:24

## 2020-08-06 RX ADMIN — FENTANYL CITRATE 50 MICROGRAM(S): 50 INJECTION INTRAVENOUS at 13:15

## 2020-08-06 RX ADMIN — FENTANYL CITRATE 50 MICROGRAM(S): 50 INJECTION INTRAVENOUS at 13:52

## 2020-08-06 RX ADMIN — Medication 100 MILLIGRAM(S): at 21:32

## 2020-08-06 RX ADMIN — Medication 100 MILLIGRAM(S): at 16:19

## 2020-08-06 RX ADMIN — FENTANYL CITRATE 50 MICROGRAM(S): 50 INJECTION INTRAVENOUS at 11:42

## 2020-08-06 RX ADMIN — FENTANYL CITRATE 50 MICROGRAM(S): 50 INJECTION INTRAVENOUS at 12:45

## 2020-08-06 RX ADMIN — ONDANSETRON 4 MILLIGRAM(S): 8 TABLET, FILM COATED ORAL at 17:53

## 2020-08-06 NOTE — PROGRESS NOTE ADULT - SUBJECTIVE AND OBJECTIVE BOX
49 yo M s/p robotic assisted partial nephrectomy, doing well. Denies any f/c/n/v/SOB/CP, abd pain controlled with current regimen, having localized incisional pain with coughing. Tolerating clears.     ICU Vital Signs Last 24 Hrs  T(C): 36.5 (06 Aug 2020 14:50), Max: 37.1 (06 Aug 2020 11:11)  T(F): 97.7 (06 Aug 2020 14:50), Max: 98.8 (06 Aug 2020 11:11)  HR: 76 (06 Aug 2020 14:50) (65 - 76)  BP: 113/62 (06 Aug 2020 14:50) (95/57 - 143/85)  BP(mean): --  ABP: --  ABP(mean): --  RR: 16 (06 Aug 2020 14:50) (11 - 16)  SpO2: 97% (06 Aug 2020 14:50) (95% - 100%)                          10.5   11.95 )-----------( 199      ( 06 Aug 2020 14:21 )             31.2   08-06    140  |  109<H>  |  15  ----------------------------<  136<H>  4.4   |  25  |  1.29    Ca    8.3<L>      06 Aug 2020 14:21    General: INAD  Abd: incisions c/d/i. MAITE with 50cc serosanguinous OP noted in bulb.   : Piedra intact. UOP satisfactory, another approx. 100cc in bag since 1pm. Urine clear light yellow    A&P: 49 yo M s/p robotic assisted partial nephrectomy, doing well.  ADAT  OOB and encourage ambulation  IV antibx x 2 doses postop.   Strict I/O's q 6hr to monitor UOP  Trend labs  DVT ppx  pain control 49 yo M s/p robotic assisted left partial nephrectomy, doing well. Denies any f/c/n/v/SOB/CP, abd pain controlled with current regimen, having localized incisional pain with coughing. Tolerating clears.     ICU Vital Signs Last 24 Hrs  T(C): 36.5 (06 Aug 2020 14:50), Max: 37.1 (06 Aug 2020 11:11)  T(F): 97.7 (06 Aug 2020 14:50), Max: 98.8 (06 Aug 2020 11:11)  HR: 76 (06 Aug 2020 14:50) (65 - 76)  BP: 113/62 (06 Aug 2020 14:50) (95/57 - 143/85)  BP(mean): --  ABP: --  ABP(mean): --  RR: 16 (06 Aug 2020 14:50) (11 - 16)  SpO2: 97% (06 Aug 2020 14:50) (95% - 100%)                          10.5   11.95 )-----------( 199      ( 06 Aug 2020 14:21 )             31.2   08-06    140  |  109<H>  |  15  ----------------------------<  136<H>  4.4   |  25  |  1.29    Ca    8.3<L>      06 Aug 2020 14:21    General: INAD  Abd: incisions c/d/i with dermabond. MAITE with 50cc serosanguinous OP noted in bulb.   : Pidera intact. UOP satisfactory, another approx. 100cc in bag since 1pm. Urine clear light yellow    A&P: 49 yo M s/p robotic assisted left partial nephrectomy, doing well.  ADAT  OOB and encourage ambulation  IV antibx x 2 doses postop.   Strict I/O's q 6hr to monitor UOP  Trend labs  DVT ppx  pain control

## 2020-08-06 NOTE — CONSULT NOTE ADULT - SUBJECTIVE AND OBJECTIVE BOX
PCP: Dr. Edward Kaur    CHIEF COMPLAINT: left nephrectomy for renal cell Ca    HISTORY OF THE PRESENT ILLNESS: This is a 51 yo male with PMH: reyes's esophagus, sleep apnea, remote HTN due to steroids prescribed for cc of cough/sob back in March.  Workup at that time included Ct scan of chest which  revealed an incidental left renal Mass, found to be + Renal cell, bloodwork revealed anemia which was felt to be related and is now S/P left Nephrectomy    PAST MEDICAL HISTORY: as above    PAST SURGICAL HISTORY: colonoscopy, left hernia repair , left knee arthroscopy 2017    FAMILY HISTORY:  Father A&W, Mother Ca; unknown which site,  1 sister A & W, 4 sons A &W    SOCIAL HISTORY:  retired , never a smoker, social ETOH, no drugs no alcohol, no drugs    Allergies: NKDA    REVIEW OF SYSTEMS:   All 10 systems reviewed in detailed and found to be negative with the exception of what has already been described above    MEDICATIONS  (STANDING):  budesonide  80 MICROgram(s)/formoterol 4.5 MICROgram(s) Inhaler 2 Puff(s) Inhalation two times a day  ceFAZolin   IVPB 2000 milliGRAM(s) IV Intermittent every 8 hours  heparin   Injectable 5000 Unit(s) SubCutaneous every 8 hours  pantoprazole    Tablet 40 milliGRAM(s) Oral daily  sodium chloride 0.9%. 1000 milliLiter(s) (125 mL/Hr) IV Continuous <Continuous>    MEDICATIONS  (PRN):  HYDROmorphone  Injectable 0.5 milliGRAM(s) IV Push every 10 minutes PRN Moderate Pain (4 - 6)  meperidine     Injectable 12.5 milliGRAM(s) IV Push every 10 minutes PRN Shivering  morphine  - Injectable 4 milliGRAM(s) IV Push every 4 hours PRN Severe Pain (7 - 10)  ondansetron Injectable 4 milliGRAM(s) IV Push every 6 hours PRN Nausea  ondansetron Injectable 4 milliGRAM(s) IV Push once PRN Nausea and/or Vomiting  oxyCODONE    IR 5 milliGRAM(s) Oral every 6 hours PRN Moderate Pain (4 - 6)      VITALS:  T(F): 98.8 (20 @ 11:11), Max: 98.8 (20 @ 11:11)  HR: 71 (20 @ 13:00) (65 - 75)  BP: 102/60 (20 @ 13:00) (95/57 - 143/85)  RR: 15 (20 @ 13:00) (12 - 16)  SpO2: 100% (20 @ 13:00) (95% - 100%)  Wt(kg): --    I&O's Summary    06 Aug 2020 07:01  -  06 Aug 2020 13:38  --------------------------------------------------------  IN: 875 mL / OUT: 300 mL / NET: 575 mL        CAPILLARY BLOOD GLUCOSE          PHYSICAL EXAM:    HEENT:  pupils equal and reactive, EOMI, no oropharyngeal lesions, erythema, exudates, oral thrush  NECK:   supple, no carotid bruits, no palpable lymph nodes, no thyromegaly  CV:  +S1, +S2, regular, no murmurs or rubs MP NSR  RESP:   lungs clear to auscultation bilaterally, no wheezing, rales, rhonchi, good air entry bilaterally, O2 sat 100%  BREAST:  not examined  GI:  abdomen soft, tender, non-distended, hypoactive BS, no bruits, no abdominal masses, no palpable masses, dressing C/D/I, MAITE draining scant amt serosang drainage  RECTAL:  not examined  :  hill draining yellow urine, no hematuria  MSK:   normal muscle tone, no atrophy, no rigidity, no contractions  EXT:  no clubbing, no cyanosis, no edema, no calf pain, swelling or erythema  VASCULAR:  pulses equal and symmetric in the upper and lower extremities  NEURO:  AAOX3, no focal neurological deficits, follows all commands, able to move extremities spontaneously  SKIN:  no ulcers, lesions or rashes    LABS: pending    EcHO: 2020: per report: Normal EF, trace to mild TR  Cardiovascular stress test: No ischemia,        EK2020  Ventricular Rate 69 BPM    Atrial Rate 69 BPM    P-R Interval 186 ms    QRS Duration 72 ms    Q-T Interval 416 ms    QTC Calculation(Bezet) 445 ms    P Axis 28 degrees    R Axis 49 degrees    T Axis 44 degrees    Diagnosis Line Normal sinus rhythm  Normal ECG  No previous ECGs available      RADIOLOGY STUDIES:     PROCEDURE DATE:  2020          INTERPRETATION:  PA and lateral chest radiographs    COMPARISON: None.    CLINICAL INFORMATION: Preoperative Chest X-ray.    FINDINGS:    The airway is midline.  There are no airspace consolidations or pleural effusions..  No pneumothorax.  The heart size is within the limits of normal.  The visualized osseus structures are intact.    IMPRESSION:    No acute radiographic cardiopulmonary pathology.      PROCEDURE DATE:  07/10/2020           INTERPRETATION:  CLINICAL INFORMATION: Kidney lesion    COMPARISON: CT abdomen/pelvis 2020.    PROCEDURE:   MRI of the abdomen was performed with and without intravenous contrast.  IV Contrast: Gadavist. 9.5 cc administered, 0.5 cc discarded.    FINDINGS:  LOWER CHEST: Within normal limits.    LIVER: 4.0 cm hemangioma in the inferior tip of the right hepatic lobe as described on CT. 2 additional hemangiomas in the left hepatic lobe, the larger measuring 10 mm.   BILE DUCTS: Normal caliber.  GALLBLADDER: Within normal limits.  SPLEEN: Within normal limits.  PANCREAS: Within normal limits.  ADRENALS: Within normal limits.  KIDNEYS/URETERS: 2.6 cm avidly enhancing mass in the posterior aspect of the lower pole of the left kidney. Small left renal cyst.    VISUALIZED PORTIONS:  BOWEL: Within normal limits. Appendix normal.  PERITONEUM: No ascites.  VESSELS: Patent renal veins and inferior vena cava.  RETROPERITONEUM/LYMPH NODES: No retroperitoneal, upper abdominal or mesenteric lymphadenopathy.    ABDOMINAL WALL: Within normal limits.  BONES: No acute abnormality.    IMPRESSION:   2.6 cm enhancing mass in the lower pole of the left kidney representing renal cell carcinoma until proven otherwise.    Multiple benign liver hemangiomas.          IMPRESSION:  51 yo male s/P left renal cell CA    -Left nephrectomy  POD#0  pain control  IVF  hill, monitor Urine output  clear liqs    Barretts esophagus  cont pantoprazole    anemia:  Monitor H/H    sleep apnea:  does not use CPAP  monitor O2 sats  Cont budesonide    VTE prophylaxis:  hep sq'  venodynes  amb      Thank you for the consult, will follow with you PCP: Dr. Edward Kaur    CHIEF COMPLAINT: left nephrectomy for renal cell Ca    HISTORY OF THE PRESENT ILLNESS: This is a 49 yo male with PMH: reyes's esophagus, sleep apnea, remote HTN due to steroids prescribed for cc of cough/sob back in November.  Once he stopped the steroids the HTN resolved.  His cough persisted  assoc with Intermittent fevers and Workup at that time included Ct scan of chest which revealed an incidental left renal Mass, found to be + Renal cell CA, bloodwork revealed anemia which was felt to be related and is now S/P left Nephrectomy. The cough was felt to be from his Barretts esophagus.    PAST MEDICAL HISTORY: as above    PAST SURGICAL HISTORY: colonoscopy, left hernia repair , left knee arthroscopy 2017    FAMILY HISTORY:  Father A&W, Mother has Lymphoma,  1 sister A & W, 4 sons A &W    SOCIAL HISTORY:  retired , never a smoker, social ETOH, no drugs no alcohol, no drugs    Allergies: NKDA    REVIEW OF SYSTEMS:   All 10 systems reviewed in detailed and found to be negative with the exception of what has already been described above    MEDICATIONS  (STANDING):  budesonide  80 MICROgram(s)/formoterol 4.5 MICROgram(s) Inhaler 2 Puff(s) Inhalation two times a day  ceFAZolin   IVPB 2000 milliGRAM(s) IV Intermittent every 8 hours  heparin   Injectable 5000 Unit(s) SubCutaneous every 8 hours  pantoprazole    Tablet 40 milliGRAM(s) Oral daily  sodium chloride 0.9%. 1000 milliLiter(s) (125 mL/Hr) IV Continuous <Continuous>    MEDICATIONS  (PRN):  HYDROmorphone  Injectable 0.5 milliGRAM(s) IV Push every 10 minutes PRN Moderate Pain (4 - 6)  meperidine     Injectable 12.5 milliGRAM(s) IV Push every 10 minutes PRN Shivering  morphine  - Injectable 4 milliGRAM(s) IV Push every 4 hours PRN Severe Pain (7 - 10)  ondansetron Injectable 4 milliGRAM(s) IV Push every 6 hours PRN Nausea  ondansetron Injectable 4 milliGRAM(s) IV Push once PRN Nausea and/or Vomiting  oxyCODONE    IR 5 milliGRAM(s) Oral every 6 hours PRN Moderate Pain (4 - 6)      VITALS:  T(F): 98.8 (20 @ 11:11), Max: 98.8 (20 @ 11:11)  HR: 71 (20 @ 13:00) (65 - 75)  BP: 102/60 (20 @ 13:00) (95/57 - 143/85)  RR: 15 (20 @ 13:00) (12 - 16)  SpO2: 100% (20 @ 13:00) (95% - 100%)  Wt(kg): --    I&O's Summary    06 Aug 2020 07:01  -  06 Aug 2020 13:38  --------------------------------------------------------  IN: 875 mL / OUT: 300 mL / NET: 575 mL        CAPILLARY BLOOD GLUCOSE          PHYSICAL EXAM:    HEENT:  pupils equal and reactive, EOMI, no oropharyngeal lesions, erythema, exudates, oral thrush  NECK:   supple, no carotid bruits, no palpable lymph nodes, no thyromegaly  CV:  +S1, +S2, regular, no murmurs or rubs MP NSR  RESP:   lungs clear to auscultation bilaterally, no wheezing, rales, rhonchi, good air entry bilaterally, O2 sat 100%  BREAST:  not examined  GI:  abdomen soft, tender, non-distended, hypoactive BS, no bruits, no abdominal masses, no palpable masses, dressing C/D/I, MAITE draining scant amt serosang drainage  RECTAL:  not examined  :  hill draining yellow urine, no hematuria  MSK:   normal muscle tone, no atrophy, no rigidity, no contractions  EXT:  no clubbing, no cyanosis, no edema, no calf pain, swelling or erythema  VASCULAR:  pulses equal and symmetric in the upper and lower extremities  NEURO:  AAOX3, no focal neurological deficits, follows all commands, able to move extremities spontaneously  SKIN:  no ulcers, lesions or rashes    LABS: pending    EcHO: 2020: per report: Normal EF, trace to mild TR  Cardiovascular stress test: No ischemia,        EK2020  Ventricular Rate 69 BPM    Atrial Rate 69 BPM    P-R Interval 186 ms    QRS Duration 72 ms    Q-T Interval 416 ms    QTC Calculation(Bezet) 445 ms    P Axis 28 degrees    R Axis 49 degrees    T Axis 44 degrees    Diagnosis Line Normal sinus rhythm  Normal ECG  No previous ECGs available      RADIOLOGY STUDIES:     PROCEDURE DATE:  2020          INTERPRETATION:  PA and lateral chest radiographs    COMPARISON: None.    CLINICAL INFORMATION: Preoperative Chest X-ray.    FINDINGS:    The airway is midline.  There are no airspace consolidations or pleural effusions..  No pneumothorax.  The heart size is within the limits of normal.  The visualized osseus structures are intact.    IMPRESSION:    No acute radiographic cardiopulmonary pathology.      PROCEDURE DATE:  07/10/2020           INTERPRETATION:  CLINICAL INFORMATION: Kidney lesion    COMPARISON: CT abdomen/pelvis 2020.    PROCEDURE:   MRI of the abdomen was performed with and without intravenous contrast.  IV Contrast: Gadavist. 9.5 cc administered, 0.5 cc discarded.    FINDINGS:  LOWER CHEST: Within normal limits.    LIVER: 4.0 cm hemangioma in the inferior tip of the right hepatic lobe as described on CT. 2 additional hemangiomas in the left hepatic lobe, the larger measuring 10 mm.   BILE DUCTS: Normal caliber.  GALLBLADDER: Within normal limits.  SPLEEN: Within normal limits.  PANCREAS: Within normal limits.  ADRENALS: Within normal limits.  KIDNEYS/URETERS: 2.6 cm avidly enhancing mass in the posterior aspect of the lower pole of the left kidney. Small left renal cyst.    VISUALIZED PORTIONS:  BOWEL: Within normal limits. Appendix normal.  PERITONEUM: No ascites.  VESSELS: Patent renal veins and inferior vena cava.  RETROPERITONEUM/LYMPH NODES: No retroperitoneal, upper abdominal or mesenteric lymphadenopathy.    ABDOMINAL WALL: Within normal limits.  BONES: No acute abnormality.    IMPRESSION:   2.6 cm enhancing mass in the lower pole of the left kidney representing renal cell carcinoma until proven otherwise.    Multiple benign liver hemangiomas.          IMPRESSION:  49 yo male s/P left renal cell CA    -Left nephrectomy  POD#0  pain control  IVF  hill, monitor Urine output  clear liqs    Barretts esophagus  cont pantoprazole    anemia:  Monitor H/H    sleep apnea:  does not use CPAP  monitor O2 sats  Cont budesonide    VTE prophylaxis:  hep sq'  venodynes  amb      Thank you for the consult, will follow with you PCP: Dr. Edward Kaur    CHIEF COMPLAINT: left nephrectomy for renal cell Ca    HISTORY OF THE PRESENT ILLNESS: This is a 49 yo male with PMH: reyes's esophagus, sleep apnea, remote HTN due to steroids prescribed for cc of cough/sob back in November.  Once he stopped the steroids the HTN resolved.  His cough persisted  assoc with Intermittent fevers and Workup at that time included Ct scan of chest which revealed an incidental left renal Mass, found to be + Renal cell CA, bloodwork revealed anemia which was felt to be related and is now S/P left Nephrectomy. The cough was felt to be from his Barretts esophagus.  Hospitalist service consulted for medical comanagement. Pt seen and examined in pacu. Felt ok. Pain controlled.     REVIEW OF SYSTEMS:   All 10 systems reviewed in detailed and found to be negative with the exception of what has already been described above      PAST MEDICAL HISTORY: as above  PAST SURGICAL HISTORY: colonoscopy, left hernia repair , left knee arthroscopy 2017  FAMILY HISTORY:  Father A&W, Mother has Lymphoma,  1 sister A & W, 4 sons A &W  SOCIAL HISTORY:  retired , never a smoker, social ETOH, no drugs no alcohol, no drugs  Allergies: NKDA  Home meds: see med rec    VITALS:  T(F): 98.8 (20 @ 11:11), Max: 98.8 (20 @ 11:11)  HR: 71 (20 @ 13:00) (65 - 75)  BP: 102/60 (20 @ 13:00) (95/57 - 143/85)  RR: 15 (20 @ 13:00) (12 - 16)  SpO2: 100% (20 @ 13:00) (95% - 100%)    PHYSICAL EXAM:    HEENT:  pupils equal and reactive, EOMI, no oropharyngeal lesions, erythema, exudates, oral thrush  NECK:   supple, no carotid bruits, no palpable lymph nodes, no thyromegaly  CV:  +S1, +S2, regular, no murmurs or rubs MP NSR  RESP:   lungs clear to auscultation bilaterally, no wheezing, rales, rhonchi, good air entry bilaterally, O2 sat 100%  BREAST:  not examined  GI:  abdomen soft, tender, non-distended, hypoactive BS, no bruits, no abdominal masses, no palpable masses, dressing C/D/I, MAITE draining scant amt serosang drainage  RECTAL:  not examined  :  hill draining yellow urine, no hematuria  MSK:   normal muscle tone, no atrophy, no rigidity, no contractions  EXT:  no clubbing, no cyanosis, no edema, no calf pain, swelling or erythema  VASCULAR:  pulses equal and symmetric in the upper and lower extremities  NEURO:  AAOX3, no focal neurological deficits, follows all commands, able to move extremities spontaneously  SKIN:  no ulcers, lesions or rashes    LABS: pending    EcHO: 2020: per report: Normal EF, trace to mild TR  Cardiovascular stress test: No ischemia,        EK2020  Ventricular Rate 69 BPM    Atrial Rate 69 BPM    P-R Interval 186 ms    QRS Duration 72 ms    Q-T Interval 416 ms    QTC Calculation(Bezet) 445 ms    P Axis 28 degrees    R Axis 49 degrees    T Axis 44 degrees    Diagnosis Line Normal sinus rhythm  Normal ECG  No previous ECGs available      RADIOLOGY STUDIES:     PROCEDURE DATE:  2020          INTERPRETATION:  PA and lateral chest radiographs    COMPARISON: None.    CLINICAL INFORMATION: Preoperative Chest X-ray.    FINDINGS:    The airway is midline.  There are no airspace consolidations or pleural effusions..  No pneumothorax.  The heart size is within the limits of normal.  The visualized osseus structures are intact.    IMPRESSION:    No acute radiographic cardiopulmonary pathology.      PROCEDURE DATE:  07/10/2020           INTERPRETATION:  CLINICAL INFORMATION: Kidney lesion    COMPARISON: CT abdomen/pelvis 2020.    PROCEDURE:   MRI of the abdomen was performed with and without intravenous contrast.  IV Contrast: Gadavist. 9.5 cc administered, 0.5 cc discarded.    FINDINGS:  LOWER CHEST: Within normal limits.    LIVER: 4.0 cm hemangioma in the inferior tip of the right hepatic lobe as described on CT. 2 additional hemangiomas in the left hepatic lobe, the larger measuring 10 mm.   BILE DUCTS: Normal caliber.  GALLBLADDER: Within normal limits.  SPLEEN: Within normal limits.  PANCREAS: Within normal limits.  ADRENALS: Within normal limits.  KIDNEYS/URETERS: 2.6 cm avidly enhancing mass in the posterior aspect of the lower pole of the left kidney. Small left renal cyst.    VISUALIZED PORTIONS:  BOWEL: Within normal limits. Appendix normal.  PERITONEUM: No ascites.  VESSELS: Patent renal veins and inferior vena cava.  RETROPERITONEUM/LYMPH NODES: No retroperitoneal, upper abdominal or mesenteric lymphadenopathy.    ABDOMINAL WALL: Within normal limits.  BONES: No acute abnormality.    IMPRESSION:   2.6 cm enhancing mass in the lower pole of the left kidney representing renal cell carcinoma until proven otherwise.    Multiple benign liver hemangiomas.          IMPRESSION:  49 yo male s/P left renal cell CA    -s/p Left nephrectomy  POD#0  pain control  IVF  hill, monitor Urine output  clear liqs    Barretts esophagus  cont pantoprazole    anemia:  Monitor H/H    sleep apnea:  does not use CPAP  monitor O2 sats  Cont budesonide    VTE prophylaxis:  hep sq'  venodynes  amb      Thank you for the consult, will follow with you

## 2020-08-06 NOTE — CONSULT NOTE ADULT - ATTENDING COMMENTS
pt seen and examined. d/w NP Akiko Motta. Agree with documentation as above with changes made where appropriate.

## 2020-08-07 ENCOUNTER — TRANSCRIPTION ENCOUNTER (OUTPATIENT)
Age: 50
End: 2020-08-07

## 2020-08-07 LAB
ANION GAP SERPL CALC-SCNC: 7 MMOL/L — SIGNIFICANT CHANGE UP (ref 5–17)
BASOPHILS # BLD AUTO: 0.01 K/UL — SIGNIFICANT CHANGE UP (ref 0–0.2)
BASOPHILS NFR BLD AUTO: 0.1 % — SIGNIFICANT CHANGE UP (ref 0–2)
BUN SERPL-MCNC: 19 MG/DL — SIGNIFICANT CHANGE UP (ref 7–23)
CALCIUM SERPL-MCNC: 8.2 MG/DL — LOW (ref 8.5–10.1)
CHLORIDE SERPL-SCNC: 109 MMOL/L — HIGH (ref 96–108)
CO2 SERPL-SCNC: 25 MMOL/L — SIGNIFICANT CHANGE UP (ref 22–31)
CREAT SERPL-MCNC: 1.25 MG/DL — SIGNIFICANT CHANGE UP (ref 0.5–1.3)
EOSINOPHIL # BLD AUTO: 0 K/UL — SIGNIFICANT CHANGE UP (ref 0–0.5)
EOSINOPHIL NFR BLD AUTO: 0 % — SIGNIFICANT CHANGE UP (ref 0–6)
GLUCOSE SERPL-MCNC: 122 MG/DL — HIGH (ref 70–99)
HCT VFR BLD CALC: 29.8 % — LOW (ref 39–50)
HGB BLD-MCNC: 9.8 G/DL — LOW (ref 13–17)
IMM GRANULOCYTES NFR BLD AUTO: 0.5 % — SIGNIFICANT CHANGE UP (ref 0–1.5)
LYMPHOCYTES # BLD AUTO: 0.87 K/UL — LOW (ref 1–3.3)
LYMPHOCYTES # BLD AUTO: 7.4 % — LOW (ref 13–44)
MCHC RBC-ENTMCNC: 30.2 PG — SIGNIFICANT CHANGE UP (ref 27–34)
MCHC RBC-ENTMCNC: 32.9 GM/DL — SIGNIFICANT CHANGE UP (ref 32–36)
MCV RBC AUTO: 91.7 FL — SIGNIFICANT CHANGE UP (ref 80–100)
MONOCYTES # BLD AUTO: 0.97 K/UL — HIGH (ref 0–0.9)
MONOCYTES NFR BLD AUTO: 8.2 % — SIGNIFICANT CHANGE UP (ref 2–14)
NEUTROPHILS # BLD AUTO: 9.91 K/UL — HIGH (ref 1.8–7.4)
NEUTROPHILS NFR BLD AUTO: 83.8 % — HIGH (ref 43–77)
PLATELET # BLD AUTO: 245 K/UL — SIGNIFICANT CHANGE UP (ref 150–400)
POTASSIUM SERPL-MCNC: 4 MMOL/L — SIGNIFICANT CHANGE UP (ref 3.5–5.3)
POTASSIUM SERPL-SCNC: 4 MMOL/L — SIGNIFICANT CHANGE UP (ref 3.5–5.3)
RBC # BLD: 3.25 M/UL — LOW (ref 4.2–5.8)
RBC # FLD: 14.4 % — SIGNIFICANT CHANGE UP (ref 10.3–14.5)
SODIUM SERPL-SCNC: 141 MMOL/L — SIGNIFICANT CHANGE UP (ref 135–145)
WBC # BLD: 11.82 K/UL — HIGH (ref 3.8–10.5)
WBC # FLD AUTO: 11.82 K/UL — HIGH (ref 3.8–10.5)

## 2020-08-07 PROCEDURE — 99232 SBSQ HOSP IP/OBS MODERATE 35: CPT

## 2020-08-07 RX ORDER — LANOLIN ALCOHOL/MO/W.PET/CERES
5 CREAM (GRAM) TOPICAL AT BEDTIME
Refills: 0 | Status: DISCONTINUED | OUTPATIENT
Start: 2020-08-07 | End: 2020-08-07

## 2020-08-07 RX ORDER — ACETAMINOPHEN 500 MG
1000 TABLET ORAL ONCE
Refills: 0 | Status: COMPLETED | OUTPATIENT
Start: 2020-08-07 | End: 2020-08-07

## 2020-08-07 RX ORDER — OXYCODONE HYDROCHLORIDE 5 MG/1
10 TABLET ORAL EVERY 6 HOURS
Refills: 0 | Status: DISCONTINUED | OUTPATIENT
Start: 2020-08-07 | End: 2020-08-08

## 2020-08-07 RX ORDER — OXYCODONE HYDROCHLORIDE 5 MG/1
1 TABLET ORAL
Qty: 16 | Refills: 0
Start: 2020-08-07

## 2020-08-07 RX ORDER — LANOLIN ALCOHOL/MO/W.PET/CERES
5 CREAM (GRAM) TOPICAL AT BEDTIME
Refills: 0 | Status: DISCONTINUED | OUTPATIENT
Start: 2020-08-07 | End: 2020-08-08

## 2020-08-07 RX ORDER — ACETAMINOPHEN 500 MG
2 TABLET ORAL
Qty: 0 | Refills: 0 | DISCHARGE

## 2020-08-07 RX ADMIN — MORPHINE SULFATE 4 MILLIGRAM(S): 50 CAPSULE, EXTENDED RELEASE ORAL at 07:36

## 2020-08-07 RX ADMIN — MORPHINE SULFATE 4 MILLIGRAM(S): 50 CAPSULE, EXTENDED RELEASE ORAL at 03:35

## 2020-08-07 RX ADMIN — Medication 1000 MILLIGRAM(S): at 21:45

## 2020-08-07 RX ADMIN — BUDESONIDE AND FORMOTEROL FUMARATE DIHYDRATE 2 PUFF(S): 160; 4.5 AEROSOL RESPIRATORY (INHALATION) at 08:05

## 2020-08-07 RX ADMIN — HEPARIN SODIUM 5000 UNIT(S): 5000 INJECTION INTRAVENOUS; SUBCUTANEOUS at 05:24

## 2020-08-07 RX ADMIN — OXYCODONE HYDROCHLORIDE 5 MILLIGRAM(S): 5 TABLET ORAL at 12:18

## 2020-08-07 RX ADMIN — OXYCODONE HYDROCHLORIDE 5 MILLIGRAM(S): 5 TABLET ORAL at 11:18

## 2020-08-07 RX ADMIN — SODIUM CHLORIDE 125 MILLILITER(S): 9 INJECTION INTRAMUSCULAR; INTRAVENOUS; SUBCUTANEOUS at 07:45

## 2020-08-07 RX ADMIN — MORPHINE SULFATE 4 MILLIGRAM(S): 50 CAPSULE, EXTENDED RELEASE ORAL at 08:36

## 2020-08-07 RX ADMIN — MORPHINE SULFATE 4 MILLIGRAM(S): 50 CAPSULE, EXTENDED RELEASE ORAL at 03:20

## 2020-08-07 RX ADMIN — SODIUM CHLORIDE 125 MILLILITER(S): 9 INJECTION INTRAMUSCULAR; INTRAVENOUS; SUBCUTANEOUS at 00:15

## 2020-08-07 RX ADMIN — OXYCODONE HYDROCHLORIDE 5 MILLIGRAM(S): 5 TABLET ORAL at 01:17

## 2020-08-07 RX ADMIN — HEPARIN SODIUM 5000 UNIT(S): 5000 INJECTION INTRAVENOUS; SUBCUTANEOUS at 14:34

## 2020-08-07 RX ADMIN — Medication 5 MILLIGRAM(S): at 00:13

## 2020-08-07 RX ADMIN — ONDANSETRON 4 MILLIGRAM(S): 8 TABLET, FILM COATED ORAL at 12:20

## 2020-08-07 RX ADMIN — OXYCODONE HYDROCHLORIDE 5 MILLIGRAM(S): 5 TABLET ORAL at 00:13

## 2020-08-07 RX ADMIN — HEPARIN SODIUM 5000 UNIT(S): 5000 INJECTION INTRAVENOUS; SUBCUTANEOUS at 21:31

## 2020-08-07 RX ADMIN — PANTOPRAZOLE SODIUM 40 MILLIGRAM(S): 20 TABLET, DELAYED RELEASE ORAL at 11:14

## 2020-08-07 RX ADMIN — BUDESONIDE AND FORMOTEROL FUMARATE DIHYDRATE 2 PUFF(S): 160; 4.5 AEROSOL RESPIRATORY (INHALATION) at 20:29

## 2020-08-07 RX ADMIN — Medication 5 MILLIGRAM(S): at 21:52

## 2020-08-07 RX ADMIN — Medication 400 MILLIGRAM(S): at 21:30

## 2020-08-07 NOTE — PROGRESS NOTE ADULT - SUBJECTIVE AND OBJECTIVE BOX
Progress Note- UROLOGY    POST OPERATIVE DAY #:    1        Status Post:  robotic assisted left partial nephrectomy    SUBJECTIVE: c/o 7/10 pain and difficulty with deep breath. No flatus or BM but tolerating regular diet     MEDICATIONS  (STANDING):  budesonide  80 MICROgram(s)/formoterol 4.5 MICROgram(s) Inhaler 2 Puff(s) Inhalation two times a day  heparin   Injectable 5000 Unit(s) SubCutaneous every 8 hours  pantoprazole    Tablet 40 milliGRAM(s) Oral daily    MEDICATIONS  (PRN):  melatonin 5 milliGRAM(s) Oral at bedtime PRN Insomnia  morphine  - Injectable 4 milliGRAM(s) IV Push every 4 hours PRN Severe Pain (7 - 10)  ondansetron Injectable 4 milliGRAM(s) IV Push every 6 hours PRN Nausea  oxyCODONE    IR 5 milliGRAM(s) Oral every 6 hours PRN Moderate Pain (4 - 6)      Vital Signs Last 24 Hrs  T(C): 36.7 (07 Aug 2020 08:57), Max: 37.6 (07 Aug 2020 00:00)  T(F): 98.1 (07 Aug 2020 08:57), Max: 99.6 (07 Aug 2020 00:00)  HR: 92 (07 Aug 2020 08:57) (65 - 92)  BP: 120/55 (07 Aug 2020 08:57) (95/57 - 120/55)  BP(mean): --  RR: 18 (07 Aug 2020 08:57) (11 - 18)  SpO2: 95% (07 Aug 2020 08:57) (93% - 100%)    PHYSICAL EXAM:    Constitutional: WDWN male appears sightly uncomfortable    Respiratory: Few rhonchi     Cardiovascular:  RR S1S2 nl no     Gastrointestinal: Surgical wounds all CDI. MAITE RLQ serosanguinous Soft not distended    Genitourinary: Piedra in place urine clear yellow    Extremities: no CCE      I&O's Detail    06 Aug 2020 07:01  -  07 Aug 2020 07:00  --------------------------------------------------------  IN:    IV PiggyBack: 50 mL    lactated ringers.: 175 mL    Other: 800 mL    sodium chloride 0.9%: 1700 mL  Total IN: 2725 mL    OUT:    Drain: 120 mL    Indwelling Catheter - Urethral: 1100 mL    Other: 300 mL  Total OUT: 1520 mL    Total NET: 1205 mL      07 Aug 2020 07:01  -  07 Aug 2020 11:16  --------------------------------------------------------  IN:  Total IN: 0 mL    OUT:    Indwelling Catheter - Urethral: 400 mL  Total OUT: 400 mL    Total NET: -400 mL          LABS:                        9.8    11.82 )-----------( 245      ( 07 Aug 2020 06:42 )             29.8     08-07    141  |  109<H>  |  19  ----------------------------<  122<H>  4.0   |  25  |  1.25    Ca    8.2<L>      07 Aug 2020 06:42

## 2020-08-07 NOTE — DISCHARGE NOTE PROVIDER - NSDCFUADDINST_GEN_ALL_CORE_FT
call MD for fever more than 101F, worsening abdominal pain, recurring bloody urine and inability to urinate

## 2020-08-07 NOTE — PROGRESS NOTE ADULT - SUBJECTIVE AND OBJECTIVE BOX
PCP: Dr. Edward Kaur    CHIEF COMPLAINT: left nephrectomy for renal cell Ca    HISTORY OF THE PRESENT ILLNESS: This is a 49 yo male with PMH: reyes's esophagus, sleep apnea, remote HTN due to steroids prescribed for cc of cough/sob back in November.  Once he stopped the steroids the HTN resolved.  His cough persisted  assoc with Intermittent fevers and Workup at that time included Ct scan of chest which revealed an incidental left renal Mass, found to be + Renal cell CA, bloodwork revealed anemia which was felt to be related and is now S/P left Nephrectomy. The cough was felt to be from his Barretts esophagus.      :  seen and examined at bedside, having incisional pain, clint Po, denies any CP, sob        REVIEW OF SYSTEMS:   All 10 systems reviewed in detailed and found to be negative with the exception of what has already been described above    MEDICATIONS  (STANDING):  budesonide  80 MICROgram(s)/formoterol 4.5 MICROgram(s) Inhaler 2 Puff(s) Inhalation two times a day  heparin   Injectable 5000 Unit(s) SubCutaneous every 8 hours  pantoprazole    Tablet 40 milliGRAM(s) Oral daily  sodium chloride 0.9%. 1000 milliLiter(s) IV Continuous <Continuous>      VITALS:  T(F): 98.8 (20 @ 11:11), Max: 98.8 (20 @ 11:11)  HR: 71 (20 @ 13:00) (65 - 75)  BP: 102/60 (20 @ 13:00) (95/57 - 143/85)  RR: 15 (20 @ 13:00) (12 - 16)  SpO2: 100% (20 @ 13:00) (95% - 100%)  Wt(kg): --    I&O's Summary    06 Aug 2020 07:01  -  06 Aug 2020 13:38  --------------------------------------------------------  IN: 875 mL / OUT: 300 mL / NET: 575 mL        CAPILLARY BLOOD GLUCOSE          PHYSICAL EXAM:    HEENT:  pupils equal and reactive, EOMI, no oropharyngeal lesions, erythema, exudates, oral thrush  NECK:   supple, no carotid bruits, no palpable lymph nodes, no thyromegaly  CV:  +S1, +S2, regular, no murmurs or rubs MP NSR  RESP:   lungs clear to auscultation bilaterally, no wheezing, rales, rhonchi, good air entry bilaterally, O2 sat 100%  BREAST:  not examined  GI:  abdomen soft, tender, non-distended, hypoactive BS, no bruits, no abdominal masses, no palpable masses, dressing C/D/I, MAITE draining scant amt serosang drainage  RECTAL:  not examined  :  hill draining yellow urine, no hematuria  MSK:   normal muscle tone, no atrophy, no rigidity, no contractions  EXT:  no clubbing, no cyanosis, no edema, no calf pain, swelling or erythema  VASCULAR:  pulses equal and symmetric in the upper and lower extremities  NEURO:  AAOX3, no focal neurological deficits, follows all commands, able to move extremities spontaneously  SKIN:  no ulcers, lesions or rashes    LABS:                       9.8    11.82 )-----------( 245      ( 07 Aug 2020 06:42 )             29.8       08-07    141  |  109<H>  |  19  ----------------------------<  122<H>  4.0   |  25  |  1.25    Ca    8.2<L>      07 Aug 2020 06:42          EcHO: 2020: per report: Normal EF, trace to mild TR  Cardiovascular stress test: No ischemia,        EK2020  Ventricular Rate 69 BPM    Atrial Rate 69 BPM    P-R Interval 186 ms    QRS Duration 72 ms    Q-T Interval 416 ms    QTC Calculation(Bezet) 445 ms    P Axis 28 degrees    R Axis 49 degrees    T Axis 44 degrees    Diagnosis Line Normal sinus rhythm  Normal ECG  No previous ECGs available      RADIOLOGY STUDIES:     PROCEDURE DATE:  2020          INTERPRETATION:  PA and lateral chest radiographs    COMPARISON: None.    CLINICAL INFORMATION: Preoperative Chest X-ray.    FINDINGS:    The airway is midline.  There are no airspace consolidations or pleural effusions..  No pneumothorax.  The heart size is within the limits of normal.  The visualized osseus structures are intact.    IMPRESSION:    No acute radiographic cardiopulmonary pathology.      PROCEDURE DATE:  07/10/2020           INTERPRETATION:  CLINICAL INFORMATION: Kidney lesion    COMPARISON: CT abdomen/pelvis 2020.    PROCEDURE:   MRI of the abdomen was performed with and without intravenous contrast.  IV Contrast: Gadavist. 9.5 cc administered, 0.5 cc discarded.    FINDINGS:  LOWER CHEST: Within normal limits.    LIVER: 4.0 cm hemangioma in the inferior tip of the right hepatic lobe as described on CT. 2 additional hemangiomas in the left hepatic lobe, the larger measuring 10 mm.   BILE DUCTS: Normal caliber.  GALLBLADDER: Within normal limits.  SPLEEN: Within normal limits.  PANCREAS: Within normal limits.  ADRENALS: Within normal limits.  KIDNEYS/URETERS: 2.6 cm avidly enhancing mass in the posterior aspect of the lower pole of the left kidney. Small left renal cyst.    VISUALIZED PORTIONS:  BOWEL: Within normal limits. Appendix normal.  PERITONEUM: No ascites.  VESSELS: Patent renal veins and inferior vena cava.  RETROPERITONEUM/LYMPH NODES: No retroperitoneal, upper abdominal or mesenteric lymphadenopathy.    ABDOMINAL WALL: Within normal limits.  BONES: No acute abnormality.    IMPRESSION:   2.6 cm enhancing mass in the lower pole of the left kidney representing renal cell carcinoma until proven otherwise.    Multiple benign liver hemangiomas.          IMPRESSION:  49 yo male s/P left renal cell CA    -Left nephrectomy  POD#1  pain control  IVF  hill, monitor Urine output  diet advanced to Regular    Barretts esophagus  cont pantoprazole    anemia:  Monitor H/H stable  no transfusion needed presently    sleep apnea/cough  does not use CPAP  monitor O2 sats  Cont budesonide  Incentive spirometer    VTE prophylaxis:  hep sq  venodynes  amb PCP: Dr. Edward Kaur    CHIEF COMPLAINT: left nephrectomy for renal cell Ca    HISTORY OF THE PRESENT ILLNESS: This is a 51 yo male with PMH: reyes's esophagus, sleep apnea, remote HTN due to steroids prescribed for cc of cough/sob back in November.  Once he stopped the steroids the HTN resolved.  His cough persisted  assoc with Intermittent fevers and Workup at that time included Ct scan of chest which revealed an incidental left renal Mass, found to be + Renal cell CA, bloodwork revealed anemia which was felt to be related and is now S/P left Nephrectomy. The cough was felt to be from his Barretts esophagus.      8/7:  seen and examined at bedside, having incisional pain, clint Po, denies any CP, sob        REVIEW OF SYSTEMS:   All 10 systems reviewed in detailed and found to be negative with the exception of what has already been described above      Vital Signs Last 24 Hrs  T(C): 36.7 (07 Aug 2020 08:57), Max: 37.6 (07 Aug 2020 00:00)  T(F): 98.1 (07 Aug 2020 08:57), Max: 99.6 (07 Aug 2020 00:00)  HR: 92 (07 Aug 2020 08:57) (65 - 92)  BP: 120/55 (07 Aug 2020 08:57) (101/58 - 120/55)  RR: 18 (07 Aug 2020 08:57) (11 - 18)  SpO2: 95% (07 Aug 2020 08:57) (93% - 100%)-    PHYSICAL EXAM:    GENERAL: Comfortable, no acute distress   HEAD:  Normocephalic, atraumatic  EYES: EOMI, PERRLA  HEENT: Moist mucous membranes  NECK: Supple, No JVD  NERVOUS SYSTEM:  Alert & Oriented X3, Motor Strength 5/5 B/L upper and lower extremities  CHEST/LUNG: Clear to auscultation bilaterally  HEART: Regular rate and rhythm  ABDOMEN: Soft, appropriate post op tenderness, +MAITE with small amt of serosanguinous drainage. Dressing c/d/i  GENITOURINARY: Voiding, no palpable bladder  EXTREMITIES:   No clubbing, cyanosis, or edema  MUSCULOSKELETAL- No muscle tenderness, no joint tenderness  SKIN-no rash        LABS:                       9.8    11.82 )-----------( 245      ( 07 Aug 2020 06:42 )             29.8       08-07    141  |  109<H>  |  19  ----------------------------<  122<H>  4.0   |  25  |  1.25    Ca    8.2<L>      07 Aug 2020 06:42      ECHO: 1/8/2020: per report: Normal EF, trace to mild TR  Cardiovascular stress test: No ischemia,      RADIOLOGY STUDIES:     PROCEDURE DATE:  07/30/2020    INTERPRETATION:  PA and lateral chest radiographs  No acute radiographic cardiopulmonary pathology.      PROCEDURE DATE:  07/10/2020    COMPARISON: CT abdomen/pelvis June 25, 2020.    PROCEDURE:   MRI of the abdomen was performed with and without intravenous contrast.  IV Contrast: Gadavist. 9.5 cc administered, 0.5 cc discarded.      IMPRESSION:   2.6 cm enhancing mass in the lower pole of the left kidney representing renal cell carcinoma until proven otherwise.    Multiple benign liver hemangiomas.    MEDICATIONS  (STANDING):  budesonide  80 MICROgram(s)/formoterol 4.5 MICROgram(s) Inhaler 2 Puff(s) Inhalation two times a day  heparin   Injectable 5000 Unit(s) SubCutaneous every 8 hours  pantoprazole    Tablet 40 milliGRAM(s) Oral daily    MEDICATIONS  (PRN):  melatonin 5 milliGRAM(s) Oral at bedtime PRN Insomnia  morphine  - Injectable 4 milliGRAM(s) IV Push every 4 hours PRN Severe Pain (7 - 10)  ondansetron Injectable 4 milliGRAM(s) IV Push every 6 hours PRN Nausea  oxyCODONE    IR 5 milliGRAM(s) Oral every 6 hours PRN Moderate Pain (4 - 6)      ASSESSMENT AND PLAN:  50M, PMH AS ABOVE A/W:    1. Left renal ca s/p nephrectomy:  -POD#1  -pain control  -ambulate  -bill hill today.   -MAITE tomorrow.   -incentive spirometry    2. Barretts esophagus  -pantoprazole    3.Acute blood loss anemia:  -likely related to surgeyr  -no need for transfusion at this time.     4. sleep apnea  -does not use cpap.    5.Chronic cough  -inhaler prn.   -incentive spirometry.     6. DVT px:  -hep sq.

## 2020-08-07 NOTE — DISCHARGE NOTE PROVIDER - HOSPITAL COURSE
This is a 49 y/o male with a PMH of Barretts esophagus, sleep apnea, GERD, anemia who reports that he started with a cough in November of 2019. Work up included a CT scan of his chest and an incidental finding of a Left kidney lesion was found. He was told his cough may be related to GERD or Barrettes esophagus He reports he was also having an on and off fever from January 2020 to March 2020, no fevers since March. He was tested for COVID 19 and was negative. He denies any dysuria, hematuria frequency or nocturia.    Pt was admitted to  on 8/6/20 where he was taken to the OR and underwent a Robotic-assisted LEFT partial nephrectomy.    Post op course was stable. Méndez catheter removed on POD 1. Diet was advanced and tolerating. All post op labs stable.        Discharged home on 8/7/20 in stable condition with out pt follow up. This is a 51 y/o male with a PMH of Barretts esophagus, sleep apnea, GERD, anemia who reports that he started with a cough in November of 2019. Work up included a CT scan of his chest and an incidental finding of a Left kidney lesion was found. He was told his cough may be related to GERD or Barrettes esophagus He reports he was also having an on and off fever from January 2020 to March 2020, no fevers since March. He was tested for COVID 19 and was negative. He denies any dysuria, hematuria frequency or nocturia.    Pt was admitted to  on 8/6/20 where he was taken to the OR and underwent a Robotic-assisted LEFT partial nephrectomy.    Post op course was stable. Méndez catheter removed on POD 1. Diet was advanced and tolerating. All post op labs stable. MAITE drain was d/franki 8/7.  Pt was tachycardic and given 1liter bolus, likely dehydration.  Hospitalist was following, recommends to f/u with PCP or Pulmonologist as outpt for chronic cough.  Will f/u with Dr. Mack as scheduled.        Discharged home on 8/8/20 in stable condition with out pt follow up.

## 2020-08-07 NOTE — DISCHARGE NOTE PROVIDER - NSDCCPCAREPLAN_GEN_ALL_CORE_FT
PRINCIPAL DISCHARGE DIAGNOSIS  Diagnosis: Renal mass  Assessment and Plan of Treatment:       SECONDARY DISCHARGE DIAGNOSES  Diagnosis: Chronic GERD  Assessment and Plan of Treatment:

## 2020-08-07 NOTE — PROGRESS NOTE ADULT - ATTENDING COMMENTS
pt seen and examined this am with NP Akiko Motta. Plan of care discussed.   Agree with above documentation with changes made where appropriate

## 2020-08-07 NOTE — DISCHARGE NOTE PROVIDER - CARE PROVIDER_API CALL
Tino Mack  UROLOGY  [address] [address] [place], [place] [address]  Phone: [phone]  Fax: [phone]  Follow Up Time: 1 week

## 2020-08-07 NOTE — DISCHARGE NOTE PROVIDER - NSDCFUSCHEDAPPT_GEN_ALL_CORE_FT
JIM RODRIGUEZ ; 08/19/2020 ; [facility] [address] ; 08/21/2020 ; [facility] [address] IJM RODRIGUEZ ; 08/19/2020 ; [facility] [address] ; 08/21/2020 ; [facility] [address]

## 2020-08-07 NOTE — DISCHARGE NOTE PROVIDER - NSDCACTIVITY_GEN_ALL_CORE
Return to Work/School allowed/Do not drive or operate machinery/Do not make important decisions/Stairs allowed/Walking - Outdoors allowed/Walking - Indoors allowed/No heavy lifting/straining/Showering allowed

## 2020-08-07 NOTE — PROGRESS NOTE ADULT - ASSESSMENT
50M POD 1 s/p robotic assisted left partial nephrectomy  Stable post op course  ·	encourage deep breathing and incentive spirometry  ·	OOB ambulation  ·	Diet as clint  ·	MAITE drain DC'd  ·	DC [place]  trial of void  ·	DC home this afternoon is above criteria met    DW Dr. Mack

## 2020-08-07 NOTE — DISCHARGE NOTE PROVIDER - NSDCMRMEDTOKEN_GEN_ALL_CORE_FT
apple cider vinegar: 2 cap(s) orally once a day  famotidine 20 mg oral tablet: 1 tab(s) orally once a day  Multiple Vitamins oral tablet: 1 tab(s) orally once a day  oxyCODONE 5 mg oral tablet: 1 tab(s) orally every 6 hours, As needed, Moderate Pain (4 - 6) MDD:4  pantoprazole 40 mg oral delayed release tablet: 1 tab(s) orally once a day (at bedtime)  Symbicort 80 mcg-4.5 mcg/inh inhalation aerosol: 2 puff(s) inhaled 2 times a day, As Needed - for shortness of breath and/or wheezing  Tylenol 500 mg oral tablet: 2 tab(s) orally every 6 hours

## 2020-08-08 ENCOUNTER — TRANSCRIPTION ENCOUNTER (OUTPATIENT)
Age: 50
End: 2020-08-08

## 2020-08-08 VITALS
OXYGEN SATURATION: 98 % | SYSTOLIC BLOOD PRESSURE: 130 MMHG | TEMPERATURE: 99 F | RESPIRATION RATE: 18 BRPM | DIASTOLIC BLOOD PRESSURE: 73 MMHG | HEART RATE: 100 BPM

## 2020-08-08 LAB
ANION GAP SERPL CALC-SCNC: 4 MMOL/L — LOW (ref 5–17)
BUN SERPL-MCNC: 18 MG/DL — SIGNIFICANT CHANGE UP (ref 7–23)
CALCIUM SERPL-MCNC: 8.5 MG/DL — SIGNIFICANT CHANGE UP (ref 8.5–10.1)
CHLORIDE SERPL-SCNC: 106 MMOL/L — SIGNIFICANT CHANGE UP (ref 96–108)
CO2 SERPL-SCNC: 28 MMOL/L — SIGNIFICANT CHANGE UP (ref 22–31)
CREAT SERPL-MCNC: 1.39 MG/DL — HIGH (ref 0.5–1.3)
GLUCOSE SERPL-MCNC: 127 MG/DL — HIGH (ref 70–99)
HCT VFR BLD CALC: 30.8 % — LOW (ref 39–50)
HGB BLD-MCNC: 10 G/DL — LOW (ref 13–17)
MAGNESIUM SERPL-MCNC: 2.2 MG/DL — SIGNIFICANT CHANGE UP (ref 1.6–2.6)
MCHC RBC-ENTMCNC: 30.1 PG — SIGNIFICANT CHANGE UP (ref 27–34)
MCHC RBC-ENTMCNC: 32.5 GM/DL — SIGNIFICANT CHANGE UP (ref 32–36)
MCV RBC AUTO: 92.8 FL — SIGNIFICANT CHANGE UP (ref 80–100)
PHOSPHATE SERPL-MCNC: 2 MG/DL — LOW (ref 2.5–4.5)
PLATELET # BLD AUTO: 213 K/UL — SIGNIFICANT CHANGE UP (ref 150–400)
POTASSIUM SERPL-MCNC: 4.4 MMOL/L — SIGNIFICANT CHANGE UP (ref 3.5–5.3)
POTASSIUM SERPL-SCNC: 4.4 MMOL/L — SIGNIFICANT CHANGE UP (ref 3.5–5.3)
RBC # BLD: 3.32 M/UL — LOW (ref 4.2–5.8)
RBC # FLD: 14.5 % — SIGNIFICANT CHANGE UP (ref 10.3–14.5)
SODIUM SERPL-SCNC: 138 MMOL/L — SIGNIFICANT CHANGE UP (ref 135–145)
WBC # BLD: 9.27 K/UL — SIGNIFICANT CHANGE UP (ref 3.8–10.5)
WBC # FLD AUTO: 9.27 K/UL — SIGNIFICANT CHANGE UP (ref 3.8–10.5)

## 2020-08-08 PROCEDURE — 99232 SBSQ HOSP IP/OBS MODERATE 35: CPT

## 2020-08-08 RX ORDER — SODIUM,POTASSIUM PHOSPHATES 278-250MG
1 POWDER IN PACKET (EA) ORAL ONCE
Refills: 0 | Status: COMPLETED | OUTPATIENT
Start: 2020-08-08 | End: 2020-08-08

## 2020-08-08 RX ADMIN — Medication 1 PACKET(S): at 13:15

## 2020-08-08 RX ADMIN — BUDESONIDE AND FORMOTEROL FUMARATE DIHYDRATE 2 PUFF(S): 160; 4.5 AEROSOL RESPIRATORY (INHALATION) at 08:05

## 2020-08-08 RX ADMIN — PANTOPRAZOLE SODIUM 40 MILLIGRAM(S): 20 TABLET, DELAYED RELEASE ORAL at 10:00

## 2020-08-08 RX ADMIN — HEPARIN SODIUM 5000 UNIT(S): 5000 INJECTION INTRAVENOUS; SUBCUTANEOUS at 13:15

## 2020-08-08 RX ADMIN — HEPARIN SODIUM 5000 UNIT(S): 5000 INJECTION INTRAVENOUS; SUBCUTANEOUS at 06:05

## 2020-08-08 NOTE — CHART NOTE - NSCHARTNOTEFT_GEN_A_CORE
Pt seen and examined at bedside earlier this am.  IS encouraged, Pt c/o mild pain and has been walking around and using IS. +flatus, no BM     Vital Signs Last 24 Hrs  T(C): 36.9 (08 Aug 2020 08:49), Max: 37.8 (07 Aug 2020 19:22)  T(F): 98.4 (08 Aug 2020 08:49), Max: 100.1 (07 Aug 2020 19:22)  HR: 111 (08 Aug 2020 08:49) (90 - 111)  BP: 137/65 (08 Aug 2020 08:49) (132/70 - 137/74)  BP(mean): --  RR: 18 (08 Aug 2020 08:49) (18 - 18)  SpO2: 97% (08 Aug 2020 08:49) (94% - 97%)      07 Aug 2020 07:01  -  08 Aug 2020 07:00  --------------------------------------------------------  IN:  Total IN: 0 mL    OUT:    Indwelling Catheter - Urethral: 445 mL  Total OUT: 445 mL    Total NET: -445 mL    PE-  CV-tachy (111), S1, S2  Lungs- CTA b/L good expansion, no wheezing  Abd- soft, incision sites clean and dry, dermabond on top, MAITE site-DSD clean +BS    Assessment,   POD#2 Robotic Left partial nephrectomy    Plan-  hospitalist c/s for tachycardia, f/u hospitalist pending d/c home, continue all current medical treatments, encourage IS and ambulation, pain control.    d/w Dr. Mack

## 2020-08-08 NOTE — PROGRESS NOTE ADULT - SUBJECTIVE AND OBJECTIVE BOX
PCP: Dr. Edward Kaur    CHIEF COMPLAINT: left nephrectomy for renal cell Ca    HPI: 49 yo male with PMH: reyes's esophagus, sleep apnea, remote HTN due to steroids prescribed for cc of cough/sob back in November.  Once he stopped the steroids the HTN resolved.  His cough persisted  assoc with Intermittent fevers and Workup at that time included Ct scan of chest which revealed an incidental left renal Mass, found to be + Renal cell CA, bloodwork revealed anemia which was felt to be related and is now S/P left Nephrectomy. The cough was felt to be from his Barretts esophagus.     8/8 pt seen and examined this am. Had a few glasses of water yesterday. tolerating po. No difficulty voiding. Ambulating without difficulty. Denies pain.  No CP/SOB.   No dysuria.     REVIEW OF SYSTEMS:   All 10 systems reviewed in detailed and found to be negative with the exception of what has already been described above      Vital Signs Last 24 Hrs  T(C): 36.9 (08 Aug 2020 08:49), Max: 37.8 (07 Aug 2020 19:22)  T(F): 98.4 (08 Aug 2020 08:49), Max: 100.1 (07 Aug 2020 19:22)  HR: 111 (08 Aug 2020 08:49) (90 - 111)  BP: 137/65 (08 Aug 2020 08:49) (132/70 - 137/74)  RR: 18 (08 Aug 2020 08:49) (18 - 18)  SpO2: 97% (08 Aug 2020 08:49) (94% - 97%)    PHYSICAL EXAM:    GENERAL: Comfortable, no acute distress   HEAD:  Normocephalic, atraumatic  EYES: EOMI, PERRLA  HEENT: dry mucous membranes  NECK: Supple, No JVD  NERVOUS SYSTEM:  Alert & Oriented X3, Motor Strength 5/5 B/L upper and lower extremities  CHEST/LUNG: Clear to auscultation bilaterally  HEART: Regular rate and rhythm  ABDOMEN: Soft, appropriate post op tenderness, dressing c/d/i  GENITOURINARY: Voiding, no palpable bladder  EXTREMITIES:   No clubbing, cyanosis, or edema  MUSCULOSKELETAL- No muscle tenderness, no joint tenderness  SKIN-no rash    LABS:                        10.0   9.27  )-----------( 213      ( 08 Aug 2020 06:17 )             30.8     08-08    138  |  106  |  18  ----------------------------<  127<H>  4.4   |  28  |  1.39<H>    Ca    8.5      08 Aug 2020 06:17  Phos  2.0     08-08  Mg     2.2     08-08                  RADIOLOGY STUDIES:     PROCEDURE DATE:  07/30/2020    INTERPRETATION:  PA and lateral chest radiographs  No acute radiographic cardiopulmonary pathology.      PROCEDURE DATE:  07/10/2020    COMPARISON: CT abdomen/pelvis June 25, 2020.    PROCEDURE:   MRI of the abdomen was performed with and without intravenous contrast.  IV Contrast: Gadavist. 9.5 cc administered, 0.5 cc discarded.      IMPRESSION:   2.6 cm enhancing mass in the lower pole of the left kidney representing renal cell carcinoma until proven otherwise.    Multiple benign liver hemangiomas.    MEDICATIONS  (STANDING):  budesonide  80 MICROgram(s)/formoterol 4.5 MICROgram(s) Inhaler 2 Puff(s) Inhalation two times a day  heparin   Injectable 5000 Unit(s) SubCutaneous every 8 hours  pantoprazole    Tablet 40 milliGRAM(s) Oral daily  potassium phosphate / sodium phosphate Powder (PHOS-NaK) 1 Packet(s) Oral once    MEDICATIONS  (PRN):  melatonin 5 milliGRAM(s) Oral at bedtime PRN Insomnia  morphine  - Injectable 4 milliGRAM(s) IV Push every 4 hours PRN Severe Pain (7 - 10)  ondansetron Injectable 4 milliGRAM(s) IV Push every 6 hours PRN Nausea  oxyCODONE    IR 5 milliGRAM(s) Oral every 6 hours PRN Moderate Pain (4 - 6)  oxyCODONE    IR 10 milliGRAM(s) Oral every 6 hours PRN Severe Pain (7 - 10)        ASSESSMENT AND PLAN:  50M, PMH AS ABOVE A/W:    1. Left renal ca s/p nephrectomy:  -POD#2  -pain controlled  -tachycardia noted, suspect related to hypovolemia-->ivf X 1 L, then repeat vitals.     2. Barretts esophagus  -pantoprazole    3.Acute blood loss anemia:  -likely related to surgery  -h/h now stable  -no need for transfusion at this time.     4. sleep apnea  -does not use cpap.    5.Chronic cough  -inhaler prn.   -incentive spirometry.     6. DVT px:  -hep sq.    7. Dispo:  no medical contraindications for dc if hr improved with ivf.

## 2020-08-08 NOTE — CHART NOTE - NSCHARTNOTEFT_GEN_A_CORE
PT seen and examined after ivf bolus  HR improved  feels fine.   no pain. NO worsening cough or sputum.   no chest pain or sob.   no dizziness/lightheadedness  no difficulty voiding or dysuria.   PT ok for dc home from medicine standpoint  informed pt to call pcp or return to er if fevers/chills/worsening cough/sputum or dysuria  d/w surgical PA

## 2020-08-08 NOTE — DISCHARGE NOTE NURSING/CASE MANAGEMENT/SOCIAL WORK - PATIENT PORTAL LINK FT
You can access the FollowMyHealth Patient Portal offered by [facility] by registering at the following website: [email or web address]. By joining [facility] FollowMyHealth portal, you will also be able to view your health information using other applications (apps) compatible with our system.

## 2020-08-19 ENCOUNTER — APPOINTMENT (OUTPATIENT)
Dept: UROLOGY | Facility: CLINIC | Age: 50
End: 2020-08-19
Payer: COMMERCIAL

## 2020-08-19 VITALS
WEIGHT: 194 LBS | OXYGEN SATURATION: 98 % | TEMPERATURE: 97.8 F | BODY MASS INDEX: 28.73 KG/M2 | HEIGHT: 69 IN | SYSTOLIC BLOOD PRESSURE: 126 MMHG | DIASTOLIC BLOOD PRESSURE: 75 MMHG | HEART RATE: 87 BPM

## 2020-08-19 PROCEDURE — 99024 POSTOP FOLLOW-UP VISIT: CPT

## 2020-08-21 ENCOUNTER — APPOINTMENT (OUTPATIENT)
Dept: PULMONOLOGY | Facility: CLINIC | Age: 50
End: 2020-08-21

## 2020-08-25 NOTE — PHYSICAL EXAM
[Normal Appearance] : normal appearance [General Appearance - In No Acute Distress] : no acute distress [Abdomen Soft] : soft [Urethral Meatus] : meatus normal [Skin Color & Pigmentation] : normal skin color and pigmentation [Edema] : no peripheral edema [] : no respiratory distress [Oriented To Time, Place, And Person] : oriented to person, place, and time [Affect] : the affect was normal [Normal Station and Gait] : the gait and station were normal for the patient's age [No Focal Deficits] : no focal deficits [No Palpable Adenopathy] : no palpable adenopathy [Abdomen Tenderness] : non-tender [Urinary Bladder Findings] : the bladder was normal on palpation [Scrotum] : the scrotum was normal [Testes Mass (___cm)] : there were no testicular masses [FreeTextEntry1] : abdominal incisions w/o signs or symptoms of infection/drainage.

## 2020-08-25 NOTE — PHYSICAL EXAM
[Normal Appearance] : normal appearance [General Appearance - In No Acute Distress] : no acute distress [Abdomen Soft] : soft [Urethral Meatus] : meatus normal [Skin Color & Pigmentation] : normal skin color and pigmentation [Edema] : no peripheral edema [] : no respiratory distress [Oriented To Time, Place, And Person] : oriented to person, place, and time [Affect] : the affect was normal [Normal Station and Gait] : the gait and station were normal for the patient's age [No Focal Deficits] : no focal deficits [No Palpable Adenopathy] : no palpable adenopathy [Abdomen Tenderness] : non-tender [Scrotum] : the scrotum was normal [Urinary Bladder Findings] : the bladder was normal on palpation [Testes Mass (___cm)] : there were no testicular masses [FreeTextEntry1] : abdominal incisions w/o signs or symptoms of infection/drainage.

## 2020-08-25 NOTE — HISTORY OF PRESENT ILLNESS
[FreeTextEntry1] : 49 yo presents today for a 2 week POA s/p left partial nephrectomy 8.6.2020. Pt. had experienced fevers after being discharged from the hospital and refused to go to ER as advised. Tmax 101 degrees F. Last fever was Friday. 8.14.2020. Pt. reports feeling much better overall. Here to discuss final surgical pathology.

## 2020-08-25 NOTE — ASSESSMENT
[FreeTextEntry1] : Discussed and reviewed final pathology with patient.\par Dx: oncocytoma 2.2 cm.\par Ordered a repeat NM renal scan for post-operative evaluation in 2 months.\par Ordered CT abdomen w & w/o contrast in 4 months. \par Next follow-up appointment in 4 months after CT scan is completed.

## 2020-08-25 NOTE — HISTORY OF PRESENT ILLNESS
[FreeTextEntry1] : 51 yo presents today for a 2 week POA s/p left partial nephrectomy 8.6.2020. Pt. had experienced fevers after being discharged from the hospital and refused to go to ER as advised. Tmax 101 degrees F. Last fever was Friday. 8.14.2020. Pt. reports feeling much better overall. Here to discuss final surgical pathology.

## 2020-09-15 ENCOUNTER — APPOINTMENT (OUTPATIENT)
Dept: PULMONOLOGY | Facility: CLINIC | Age: 50
End: 2020-09-15
Payer: COMMERCIAL

## 2020-09-15 VITALS
BODY MASS INDEX: 28.58 KG/M2 | HEART RATE: 82 BPM | HEIGHT: 69 IN | OXYGEN SATURATION: 98 % | SYSTOLIC BLOOD PRESSURE: 138 MMHG | WEIGHT: 193 LBS | RESPIRATION RATE: 14 BRPM | DIASTOLIC BLOOD PRESSURE: 68 MMHG

## 2020-09-15 PROCEDURE — 99214 OFFICE O/P EST MOD 30 MIN: CPT

## 2020-09-15 NOTE — HISTORY OF PRESENT ILLNESS
[TextBox_4] : The patient reports that cough has persisted. Benzonatate helps somewhat. The patient saw ENT who did not find anything. He was incidentally found to have a renal mass which was resected last month and was found to be an oncocytoma. Patient reports that he's been on Advair which he doesn't think helps but in the past she was on Symbicort which helped somewhat. He had a sleep study done last year at [facility] and was told of having mild obstructive sleep apnea. He is not being treated for it but he did raise the head of his bed and does sleep better with it.

## 2020-09-15 NOTE — ASSESSMENT
[FreeTextEntry1] : Chronic cough of unclear etiology. This may be bronchospastic or still might be due to reflux. Symbicort 160/4.52 sprays b.i.d. is being tried. We will try to obtain the records as far as his sleep study goes. Followup in 6 weeks.

## 2020-09-15 NOTE — PHYSICAL EXAM
[No Acute Distress] : no acute distress [Low Lying Soft Palate] : low lying soft palate [III] : Mallampati Class: III [Normal Appearance] : normal appearance [Neck Circumference: ___] : neck circumference: [unfilled] [No Neck Mass] : no neck mass [Normal Rate/Rhythm] : normal rate/rhythm [Normal S1, S2] : normal s1, s2 [No Murmurs] : no murmurs [No Resp Distress] : no resp distress [Clear to Auscultation Bilaterally] : clear to auscultation bilaterally [No Abnormalities] : no abnormalities [Benign] : benign [Normal Gait] : normal gait [No Clubbing] : no clubbing [No Cyanosis] : no cyanosis [No Edema] : no edema [FROM] : FROM [Normal Color/ Pigmentation] : normal color/ pigmentation [No Focal Deficits] : no focal deficits [Oriented x3] : oriented x3 [Normal Affect] : normal affect [TextBox_2] : overweight [TextBox_68] : hacking cough with inspiration.

## 2020-10-22 ENCOUNTER — OUTPATIENT (OUTPATIENT)
Dept: OUTPATIENT SERVICES | Facility: HOSPITAL | Age: 50
LOS: 1 days | End: 2020-10-22
Payer: COMMERCIAL

## 2020-10-22 DIAGNOSIS — Z98.890 OTHER SPECIFIED POSTPROCEDURAL STATES: Chronic | ICD-10-CM

## 2020-10-22 DIAGNOSIS — K22.70 BARRETT'S ESOPHAGUS WITHOUT DYSPLASIA: ICD-10-CM

## 2020-10-22 PROCEDURE — 74220 X-RAY XM ESOPHAGUS 1CNTRST: CPT | Mod: 26

## 2020-10-22 PROCEDURE — 74220 X-RAY XM ESOPHAGUS 1CNTRST: CPT

## 2020-10-30 ENCOUNTER — APPOINTMENT (OUTPATIENT)
Dept: PULMONOLOGY | Facility: CLINIC | Age: 50
End: 2020-10-30
Payer: COMMERCIAL

## 2020-10-30 VITALS
OXYGEN SATURATION: 96 % | DIASTOLIC BLOOD PRESSURE: 70 MMHG | SYSTOLIC BLOOD PRESSURE: 140 MMHG | WEIGHT: 194 LBS | HEART RATE: 106 BPM | BODY MASS INDEX: 28.65 KG/M2 | RESPIRATION RATE: 16 BRPM

## 2020-10-30 DIAGNOSIS — R05 COUGH: ICD-10-CM

## 2020-10-30 DIAGNOSIS — D69.2 OTHER NONTHROMBOCYTOPENIC PURPURA: ICD-10-CM

## 2020-10-30 DIAGNOSIS — G47.33 OBSTRUCTIVE SLEEP APNEA (ADULT) (PEDIATRIC): ICD-10-CM

## 2020-10-30 PROCEDURE — 99072 ADDL SUPL MATRL&STAF TM PHE: CPT

## 2020-10-30 PROCEDURE — 99214 OFFICE O/P EST MOD 30 MIN: CPT

## 2020-10-30 RX ORDER — BENZONATATE 100 MG/1
100 CAPSULE ORAL 3 TIMES DAILY
Qty: 90 | Refills: 1 | Status: COMPLETED | COMMUNITY
Start: 2020-06-11 | End: 2020-10-30

## 2020-10-30 NOTE — PHYSICAL EXAM
[No Acute Distress] : no acute distress [Low Lying Soft Palate] : low lying soft palate [III] : Mallampati Class: III [Normal Appearance] : normal appearance [Neck Circumference: ___] : neck circumference: [unfilled] [No Neck Mass] : no neck mass [Normal Rate/Rhythm] : normal rate/rhythm [Normal S1, S2] : normal s1, s2 [No Murmurs] : no murmurs [No Resp Distress] : no resp distress [Clear to Auscultation Bilaterally] : clear to auscultation bilaterally [No Abnormalities] : no abnormalities [Benign] : benign [Normal Gait] : normal gait [No Clubbing] : no clubbing [No Cyanosis] : no cyanosis [No Edema] : no edema [FROM] : FROM [No Focal Deficits] : no focal deficits [Oriented x3] : oriented x3 [Normal Affect] : normal affect [TextBox_2] : overweight [TextBox_125] : purpuric rash on lower extremities

## 2020-10-30 NOTE — HISTORY OF PRESENT ILLNESS
[TextBox_4] : The patient continues to have cough intermittently. He reports some dyspnea on exertion and is not clear if Symbicort is helping. The patient had a recent esophagram performed by GI. Also he tells me that he has had a purpuric rash on his legs for the past 5 years. He saw one dermatologist but was not biopsied and will be going to see another dermatologist in the near future. He is following up with GI in the next 3 weeks.\par \par I was able to obtain his sleep studies from [facility]. In 2018 he has a home sleep study showing AHI of 39. 2019 he had a split-night study showing AHI of 14 and a therapeutic pressure of 5-7 cm water. He could not tolerate CPAP.

## 2020-10-30 NOTE — ASSESSMENT
[FreeTextEntry1] : Patient's cough is of unclear etiology. He has an esophageal mucosal abnormality. He'll be seeing GI and will probably get endoscopy.\par \par The purpuric rash that he has is not likely to be vasculitis, in the face of a sedimentation rate of 2, and normal platelet counts.\par \par The patient has probable severe obstructive sleep apnea. This should be treated. He was intolerant of CPAP and would be a good oral appliance candidate. We will table this until next visit at which point we can review findings from the other specialists.

## 2020-12-19 ENCOUNTER — OUTPATIENT (OUTPATIENT)
Dept: OUTPATIENT SERVICES | Facility: HOSPITAL | Age: 50
LOS: 1 days | End: 2020-12-19
Payer: COMMERCIAL

## 2020-12-19 ENCOUNTER — APPOINTMENT (OUTPATIENT)
Dept: CT IMAGING | Facility: CLINIC | Age: 50
End: 2020-12-19
Payer: COMMERCIAL

## 2020-12-19 DIAGNOSIS — Z98.890 OTHER SPECIFIED POSTPROCEDURAL STATES: Chronic | ICD-10-CM

## 2020-12-19 DIAGNOSIS — N28.9 DISORDER OF KIDNEY AND URETER, UNSPECIFIED: ICD-10-CM

## 2020-12-19 DIAGNOSIS — Z00.8 ENCOUNTER FOR OTHER GENERAL EXAMINATION: ICD-10-CM

## 2020-12-19 PROCEDURE — 74170 CT ABD WO CNTRST FLWD CNTRST: CPT | Mod: 26

## 2020-12-19 PROCEDURE — 82565 ASSAY OF CREATININE: CPT

## 2020-12-19 PROCEDURE — 74170 CT ABD WO CNTRST FLWD CNTRST: CPT

## 2020-12-21 ENCOUNTER — NON-APPOINTMENT (OUTPATIENT)
Age: 50
End: 2020-12-21

## 2020-12-23 ENCOUNTER — APPOINTMENT (OUTPATIENT)
Dept: UROLOGY | Facility: CLINIC | Age: 50
End: 2020-12-23
Payer: COMMERCIAL

## 2020-12-23 VITALS
WEIGHT: 197 LBS | TEMPERATURE: 97.9 F | SYSTOLIC BLOOD PRESSURE: 145 MMHG | HEIGHT: 68 IN | DIASTOLIC BLOOD PRESSURE: 77 MMHG | HEART RATE: 85 BPM | OXYGEN SATURATION: 98 % | BODY MASS INDEX: 29.86 KG/M2

## 2020-12-23 DIAGNOSIS — D30.00 BENIGN NEOPLASM OF UNSPECIFIED KIDNEY: ICD-10-CM

## 2020-12-23 PROCEDURE — 99213 OFFICE O/P EST LOW 20 MIN: CPT

## 2020-12-23 PROCEDURE — 99072 ADDL SUPL MATRL&STAF TM PHE: CPT

## 2020-12-23 NOTE — HISTORY OF PRESENT ILLNESS
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 Pt. reports feeling well overall. Recent CT scan 12.19.2020 showed no evidence of metastatic disease in the abdomen. Pt. did not have his renal scan completed.

## 2020-12-23 NOTE — PHYSICAL EXAM
[Normal Appearance] : normal appearance [Well Groomed] : well groomed [General Appearance - In No Acute Distress] : no acute distress [Abdomen Soft] : soft [Urethral Meatus] : meatus normal [Skin Color & Pigmentation] : normal skin color and pigmentation [Edema] : no peripheral edema [] : no respiratory distress [Oriented To Time, Place, And Person] : oriented to person, place, and time [Normal Station and Gait] : the gait and station were normal for the patient's age [No Focal Deficits] : no focal deficits [No Palpable Adenopathy] : no palpable adenopathy [FreeTextEntry1] : healed incisions assessed,

## 2020-12-23 NOTE — ASSESSMENT
[FreeTextEntry1] : Pt. doing well overall.\par No acute urological issues or concerns at time of visit.\par CT negative.\par Ordered a repeat renal US in 9 months.\par Next follow-up in 9 months after US completed.

## 2020-12-28 ENCOUNTER — APPOINTMENT (OUTPATIENT)
Dept: PULMONOLOGY | Facility: CLINIC | Age: 50
End: 2020-12-28

## 2021-01-18 ENCOUNTER — OUTPATIENT (OUTPATIENT)
Dept: OUTPATIENT SERVICES | Facility: HOSPITAL | Age: 51
LOS: 1 days | End: 2021-01-18
Payer: COMMERCIAL

## 2021-01-18 ENCOUNTER — APPOINTMENT (OUTPATIENT)
Dept: RADIOLOGY | Facility: CLINIC | Age: 51
End: 2021-01-18
Payer: COMMERCIAL

## 2021-01-18 DIAGNOSIS — Z98.890 OTHER SPECIFIED POSTPROCEDURAL STATES: Chronic | ICD-10-CM

## 2021-01-18 DIAGNOSIS — Z00.8 ENCOUNTER FOR OTHER GENERAL EXAMINATION: ICD-10-CM

## 2021-01-18 PROCEDURE — 77074 RADEX OSSEOUS SURVEY LMTD: CPT | Mod: 26

## 2021-01-18 PROCEDURE — 77074 RADEX OSSEOUS SURVEY LMTD: CPT

## 2021-03-03 ENCOUNTER — RESULT REVIEW (OUTPATIENT)
Age: 51
End: 2021-03-03

## 2021-03-25 RX ORDER — BUDESONIDE AND FORMOTEROL FUMARATE DIHYDRATE 160; 4.5 UG/1; UG/1
160-4.5 AEROSOL RESPIRATORY (INHALATION) TWICE DAILY
Qty: 3 | Refills: 1 | Status: ACTIVE | COMMUNITY
Start: 2020-09-15 | End: 1900-01-01

## 2021-09-02 RX ORDER — FAMOTIDINE 20 MG/1
20 TABLET, FILM COATED ORAL DAILY
Qty: 90 | Refills: 0 | Status: ACTIVE | COMMUNITY
Start: 2020-05-28 | End: 1900-01-01

## 2021-09-08 ENCOUNTER — APPOINTMENT (OUTPATIENT)
Dept: UROLOGY | Facility: CLINIC | Age: 51
End: 2021-09-08

## 2021-09-17 ENCOUNTER — OUTPATIENT (OUTPATIENT)
Dept: OUTPATIENT SERVICES | Facility: HOSPITAL | Age: 51
LOS: 1 days | End: 2021-09-17
Payer: COMMERCIAL

## 2021-09-17 ENCOUNTER — APPOINTMENT (OUTPATIENT)
Dept: ULTRASOUND IMAGING | Facility: CLINIC | Age: 51
End: 2021-09-17
Payer: COMMERCIAL

## 2021-09-17 DIAGNOSIS — Z98.890 OTHER SPECIFIED POSTPROCEDURAL STATES: Chronic | ICD-10-CM

## 2021-09-17 DIAGNOSIS — Z00.8 ENCOUNTER FOR OTHER GENERAL EXAMINATION: ICD-10-CM

## 2021-09-17 DIAGNOSIS — N28.89 OTHER SPECIFIED DISORDERS OF KIDNEY AND URETER: ICD-10-CM

## 2021-09-17 PROCEDURE — 76775 US EXAM ABDO BACK WALL LIM: CPT | Mod: 26

## 2021-09-17 PROCEDURE — 76775 US EXAM ABDO BACK WALL LIM: CPT

## 2021-09-22 NOTE — DISCHARGE NOTE PROVIDER - NSTOBACCOUSAGEY/N_GEN_A_CS
Writer called patient regarding labs to complete at least two days prior to upcoming appointment on 9/27/21 with Dr. Singh. Spoke with son Lonnie. Per Lonnie need to reschedule as sister can no longer bring patient to appointment. Writer offered appointment on 9/29/21, but unable to complete lab prior. Appointment rescheduled to 10/13/21.     Lab orders checked and current.   
No

## 2021-09-23 ENCOUNTER — NON-APPOINTMENT (OUTPATIENT)
Age: 51
End: 2021-09-23

## 2021-10-04 ENCOUNTER — APPOINTMENT (OUTPATIENT)
Dept: UROLOGY | Facility: CLINIC | Age: 51
End: 2021-10-04